# Patient Record
Sex: FEMALE | URBAN - METROPOLITAN AREA
[De-identification: names, ages, dates, MRNs, and addresses within clinical notes are randomized per-mention and may not be internally consistent; named-entity substitution may affect disease eponyms.]

---

## 2022-09-12 ENCOUNTER — APPOINTMENT (OUTPATIENT)
Dept: URBAN - METROPOLITAN AREA CLINIC 253 | Age: 32
Setting detail: DERMATOLOGY
End: 2022-09-14

## 2022-09-12 VITALS — WEIGHT: 160 LBS | HEIGHT: 65 IN

## 2022-09-12 DIAGNOSIS — B07.8 OTHER VIRAL WARTS: ICD-10-CM

## 2022-09-12 PROCEDURE — OTHER BENIGN DESTRUCTION: OTHER

## 2022-09-12 PROCEDURE — 17110 DESTRUCT B9 LESION 1-14: CPT

## 2022-09-12 PROCEDURE — OTHER ADDITIONAL NOTES: OTHER

## 2022-09-12 PROCEDURE — OTHER MIPS QUALITY: OTHER

## 2022-09-12 PROCEDURE — OTHER COUNSELING: OTHER

## 2022-09-12 PROCEDURE — 99202 OFFICE O/P NEW SF 15 MIN: CPT | Mod: 25

## 2022-09-12 ASSESSMENT — LOCATION DETAILED DESCRIPTION DERM: LOCATION DETAILED: RIGHT MEDIAL PLANTAR 1ST TOE

## 2022-09-12 ASSESSMENT — LOCATION ZONE DERM: LOCATION ZONE: TOE

## 2022-09-12 ASSESSMENT — LOCATION SIMPLE DESCRIPTION DERM: LOCATION SIMPLE: PLANTAR SURFACE OF RIGHT 1ST TOE

## 2022-09-12 NOTE — PROCEDURE: ADDITIONAL NOTES
Additional Notes: Recommended using wart stick at home. Recommended 30 min follow up in 2 weeks- may consider bleomycin if not resolved
Detail Level: Detailed
Render Risk Assessment In Note?: no

## 2022-09-12 NOTE — PROCEDURE: BENIGN DESTRUCTION
Treatment Number (Will Not Render If 0): 0
Add 52 Modifier (Optional): no
Anesthesia Type: 2% lidocaine with epinephrine
Anesthesia Volume In Cc: 0.4
Render Post-Care Instructions In Note?: yes
Consent: The patient's consent was obtained including but not limited to risks of crusting, scabbing, blistering, scarring, darker or lighter pigmentary change, recurrence, incomplete removal and infection.
Post-Care Instructions: I reviewed with the patient in detail post-care instructions. Patient is to wear sunprotection, and avoid picking at any of the treated lesions. Pt may apply Vaseline to crusted or scabbing areas.
Medical Necessity Clause: This procedure was medically necessary because the lesions that were treated were:
Medical Necessity Information: It is in your best interest to select a reason for this procedure from the list below. All of these items fulfill various CMS LCD requirements except the new and changing color options.
Detail Level: Detailed

## 2022-09-12 NOTE — HPI: WARTS (VERRUCA)
Is This A New Presentation, Or A Follow-Up?: Warts
Additional History: She has had warts for years. Moved this direction from [place] recently. \\n\\nLast year, she treated at home with OTC kits (liquid nitrogen and wrap kits) \\n\\nShe has tried wart peel (last treatment one week ago).  She has had liquid nitrogen, paring for 5 times.  Previously, she has had laser treatment done in addition to canthacur on other warts years ago.\\n\\nThis is her first visit to [facility] and seeing Emiliana Mancini PA-C

## 2022-10-27 ENCOUNTER — APPOINTMENT (OUTPATIENT)
Dept: URBAN - METROPOLITAN AREA CLINIC 253 | Age: 32
Setting detail: DERMATOLOGY
End: 2022-11-02

## 2022-10-27 VITALS — RESPIRATION RATE: 14 BRPM | WEIGHT: 163 LBS | HEIGHT: 65 IN

## 2022-10-27 DIAGNOSIS — B36.0 PITYRIASIS VERSICOLOR: ICD-10-CM

## 2022-10-27 DIAGNOSIS — B07.8 OTHER VIRAL WARTS: ICD-10-CM

## 2022-10-27 PROCEDURE — OTHER PRESCRIPTION: OTHER

## 2022-10-27 PROCEDURE — OTHER ADDITIONAL NOTES: OTHER

## 2022-10-27 PROCEDURE — OTHER MIPS QUALITY: OTHER

## 2022-10-27 PROCEDURE — 99213 OFFICE O/P EST LOW 20 MIN: CPT | Mod: 25

## 2022-10-27 PROCEDURE — OTHER COUNSELING: OTHER

## 2022-10-27 PROCEDURE — 17110 DESTRUCT B9 LESION 1-14: CPT

## 2022-10-27 PROCEDURE — OTHER BENIGN DESTRUCTION: OTHER

## 2022-10-27 RX ORDER — KETOCONAZOLE 20 MG/G
2% CREAM TOPICAL BID
Qty: 60 | Refills: 2 | Status: ERX | COMMUNITY
Start: 2022-10-27

## 2022-10-27 RX ORDER — KETOCONAZOLE 20 MG/ML
2% SHAMPOO, SUSPENSION TOPICAL QD
Qty: 120 | Refills: 2 | Status: ERX | COMMUNITY
Start: 2022-10-27

## 2022-10-27 ASSESSMENT — LOCATION DETAILED DESCRIPTION DERM
LOCATION DETAILED: LEFT INFERIOR ANTERIOR NECK
LOCATION DETAILED: RIGHT MEDIAL PLANTAR 1ST TOE
LOCATION DETAILED: RIGHT LATERAL PLANTAR 1ST TOE

## 2022-10-27 ASSESSMENT — LOCATION ZONE DERM
LOCATION ZONE: TOE
LOCATION ZONE: NECK

## 2022-10-27 ASSESSMENT — LOCATION SIMPLE DESCRIPTION DERM
LOCATION SIMPLE: PLANTAR SURFACE OF RIGHT 1ST TOE
LOCATION SIMPLE: LEFT ANTERIOR NECK

## 2022-10-27 NOTE — PROCEDURE: BENIGN DESTRUCTION
Add 52 Modifier (Optional): no
Treatment Number (Will Not Render If 0): 0
Render Post-Care Instructions In Note?: yes
Medical Necessity Information: It is in your best interest to select a reason for this procedure from the list below. All of these items fulfill various CMS LCD requirements except the new and changing color options.
Anesthesia Type: 1% lidocaine with epinephrine and a 1:10 solution of 8.4% sodium bicarbonate
Medical Necessity Clause: This procedure was medically necessary because the lesions that were treated were:
Post-Care Instructions: I reviewed with the patient in detail post-care instructions. Patient is to wear sunprotection, and avoid picking at any of the treated lesions. Pt may apply Vaseline to crusted or scabbing areas.
Consent: The patient's consent was obtained including but not limited to risks of crusting, scabbing, blistering, scarring, darker or lighter pigmentary change, recurrence, incomplete removal and infection.
Detail Level: Detailed

## 2022-10-27 NOTE — PROCEDURE: ADDITIONAL NOTES
Additional Notes: We discussed the correlation between Tinea Versicolor and diabetes, the patient has a brother with diabetes. She will consider consulting her PCP for diabetes workup.
Render Risk Assessment In Note?: no
Detail Level: Detailed

## 2022-10-27 NOTE — HPI: RASH
What Type Of Note Output Would You Prefer (Optional)?: Standard Output
Is The Patient Presenting As Previously Scheduled?: Yes
How Severe Is Your Rash?: moderate
Is This A New Presentation, Or A Follow-Up?: Rash
Additional History: The rash started in the beginning of the year.    It gets red and seems to worsen with heat.

## 2023-04-14 ENCOUNTER — LAB REQUISITION (OUTPATIENT)
Dept: LAB | Facility: CLINIC | Age: 33
End: 2023-04-14

## 2023-04-14 DIAGNOSIS — N92.6 IRREGULAR MENSTRUATION, UNSPECIFIED: ICD-10-CM

## 2023-04-14 LAB
MIS SERPL-MCNC: 1.25 NG/ML (ref 0.58–8.1)
PROLACTIN SERPL 3RD IS-MCNC: 20 NG/ML (ref 5–23)

## 2023-04-14 PROCEDURE — 84146 ASSAY OF PROLACTIN: CPT | Performed by: OBSTETRICS & GYNECOLOGY

## 2023-04-14 PROCEDURE — 83520 IMMUNOASSAY QUANT NOS NONAB: CPT | Performed by: OBSTETRICS & GYNECOLOGY

## 2023-04-21 ENCOUNTER — LAB REQUISITION (OUTPATIENT)
Dept: LAB | Facility: CLINIC | Age: 33
End: 2023-04-21

## 2023-04-21 DIAGNOSIS — N92.6 IRREGULAR MENSTRUATION, UNSPECIFIED: ICD-10-CM

## 2023-04-21 LAB
ESTRADIOL SERPL-MCNC: 72 PG/ML
FSH SERPL IRP2-ACNC: 5.7 MIU/ML

## 2023-04-21 PROCEDURE — 83001 ASSAY OF GONADOTROPIN (FSH): CPT | Performed by: OBSTETRICS & GYNECOLOGY

## 2023-04-21 PROCEDURE — 82670 ASSAY OF TOTAL ESTRADIOL: CPT | Performed by: OBSTETRICS & GYNECOLOGY

## 2023-11-09 ENCOUNTER — LAB REQUISITION (OUTPATIENT)
Dept: LAB | Facility: CLINIC | Age: 33
End: 2023-11-09

## 2023-11-09 DIAGNOSIS — N97.9 FEMALE INFERTILITY, UNSPECIFIED: ICD-10-CM

## 2023-11-09 LAB — TSH SERPL DL<=0.005 MIU/L-ACNC: 1.35 UIU/ML (ref 0.3–4.2)

## 2023-11-09 PROCEDURE — 84443 ASSAY THYROID STIM HORMONE: CPT | Performed by: OBSTETRICS & GYNECOLOGY

## 2023-11-09 PROCEDURE — 86762 RUBELLA ANTIBODY: CPT | Performed by: OBSTETRICS & GYNECOLOGY

## 2023-11-10 LAB
RUBV IGG SERPL QL IA: 5.74 INDEX
RUBV IGG SERPL QL IA: POSITIVE

## 2023-11-28 ENCOUNTER — ANCILLARY PROCEDURE (OUTPATIENT)
Dept: GENERAL RADIOLOGY | Facility: CLINIC | Age: 33
End: 2023-11-28
Attending: OBSTETRICS & GYNECOLOGY
Payer: COMMERCIAL

## 2023-11-28 DIAGNOSIS — N97.9 FEMALE INFERTILITY, UNSPECIFIED: ICD-10-CM

## 2023-11-28 PROCEDURE — 74740 X-RAY FEMALE GENITAL TRACT: CPT

## 2023-11-28 PROCEDURE — 58340 CATHETER FOR HYSTEROGRAPHY: CPT

## 2023-11-28 NOTE — PROGRESS NOTES
HSG Procedure    Indications: Assess tubal patency    Pre procedure Diagnosis: Infertility  Post Procedure Diagnosis: Same    UPT: Negative in clinic 11/28/23    Procedure: hysterosalpingogram  Anesthesia: none  EBL: 1 cc  Total Omnipaque: 5 cc    Procedure: With her permission, after explaining the procedure, the patient was placed in the dorsal lithotomy position.  A small Marybeth speculum was inserted.  The cervix was visualized and cleansed with betadine x 3.  A single tooth tenaculum was placed at 12 o' clock. Attempted placement with the catheter. A cervical os finder was needed. The catheter was then inserted to the internal os.      HSG was completed with findings of normal shaped cavity and spill from the right fallopian tube.  Spill not observed from the left side.     See radiology report for complete and final details.    All instruments were removed from the cervix.   Hemostasis attained with pressure.    Aylce Erickson MD  11/28/2023  11:20 AM

## 2024-01-23 ENCOUNTER — HOSPITAL ENCOUNTER (OUTPATIENT)
Facility: CLINIC | Age: 34
Discharge: HOME | End: 2024-01-23
Attending: OBSTETRICS & GYNECOLOGY | Admitting: OBSTETRICS & GYNECOLOGY
Payer: COMMERCIAL

## 2024-01-23 ENCOUNTER — ANESTHESIA (OUTPATIENT)
Dept: SURGERY | Facility: CLINIC | Age: 34
End: 2024-01-23
Payer: COMMERCIAL

## 2024-01-23 ENCOUNTER — ANESTHESIA EVENT (OUTPATIENT)
Dept: SURGERY | Facility: CLINIC | Age: 34
End: 2024-01-23
Payer: COMMERCIAL

## 2024-01-23 VITALS
OXYGEN SATURATION: 98 % | TEMPERATURE: 97.8 F | HEART RATE: 83 BPM | WEIGHT: 169 LBS | HEIGHT: 65 IN | SYSTOLIC BLOOD PRESSURE: 119 MMHG | DIASTOLIC BLOOD PRESSURE: 75 MMHG | RESPIRATION RATE: 11 BRPM | BODY MASS INDEX: 28.16 KG/M2

## 2024-01-23 DIAGNOSIS — N83.202 BILATERAL OVARIAN CYSTS: ICD-10-CM

## 2024-01-23 DIAGNOSIS — N83.201 BILATERAL OVARIAN CYSTS: ICD-10-CM

## 2024-01-23 DIAGNOSIS — Z98.890 S/P LAPAROSCOPY: Primary | ICD-10-CM

## 2024-01-23 DIAGNOSIS — Z31.41 FERTILITY TESTING: ICD-10-CM

## 2024-01-23 LAB — HCG INTACT+B SERPL-ACNC: <1 MIU/ML

## 2024-01-23 PROCEDURE — 84702 CHORIONIC GONADOTROPIN TEST: CPT | Performed by: OBSTETRICS & GYNECOLOGY

## 2024-01-23 PROCEDURE — 88342 IMHCHEM/IMCYTCHM 1ST ANTB: CPT | Mod: 26 | Performed by: PATHOLOGY

## 2024-01-23 PROCEDURE — 250N000025 HC SEVOFLURANE, PER MIN: Performed by: OBSTETRICS & GYNECOLOGY

## 2024-01-23 PROCEDURE — 258N000001 HC RX 258: Performed by: OBSTETRICS & GYNECOLOGY

## 2024-01-23 PROCEDURE — 88302 TISSUE EXAM BY PATHOLOGIST: CPT | Mod: TC | Performed by: OBSTETRICS & GYNECOLOGY

## 2024-01-23 PROCEDURE — 250N000009 HC RX 250: Performed by: NURSE ANESTHETIST, CERTIFIED REGISTERED

## 2024-01-23 PROCEDURE — 258N000003 HC RX IP 258 OP 636: Performed by: NURSE ANESTHETIST, CERTIFIED REGISTERED

## 2024-01-23 PROCEDURE — 250N000011 HC RX IP 250 OP 636: Performed by: NURSE ANESTHETIST, CERTIFIED REGISTERED

## 2024-01-23 PROCEDURE — 88341 IMHCHEM/IMCYTCHM EA ADD ANTB: CPT | Mod: 26 | Performed by: PATHOLOGY

## 2024-01-23 PROCEDURE — 258N000003 HC RX IP 258 OP 636: Performed by: STUDENT IN AN ORGANIZED HEALTH CARE EDUCATION/TRAINING PROGRAM

## 2024-01-23 PROCEDURE — 250N000011 HC RX IP 250 OP 636: Performed by: OBSTETRICS & GYNECOLOGY

## 2024-01-23 PROCEDURE — 36415 COLL VENOUS BLD VENIPUNCTURE: CPT | Performed by: OBSTETRICS & GYNECOLOGY

## 2024-01-23 PROCEDURE — 370N000017 HC ANESTHESIA TECHNICAL FEE, PER MIN: Performed by: OBSTETRICS & GYNECOLOGY

## 2024-01-23 PROCEDURE — 999N000141 HC STATISTIC PRE-PROCEDURE NURSING ASSESSMENT: Performed by: OBSTETRICS & GYNECOLOGY

## 2024-01-23 PROCEDURE — 88305 TISSUE EXAM BY PATHOLOGIST: CPT | Mod: 26 | Performed by: PATHOLOGY

## 2024-01-23 PROCEDURE — 250N000011 HC RX IP 250 OP 636: Performed by: STUDENT IN AN ORGANIZED HEALTH CARE EDUCATION/TRAINING PROGRAM

## 2024-01-23 PROCEDURE — 250N000013 HC RX MED GY IP 250 OP 250 PS 637: Performed by: OBSTETRICS & GYNECOLOGY

## 2024-01-23 PROCEDURE — 258N000003 HC RX IP 258 OP 636: Performed by: OBSTETRICS & GYNECOLOGY

## 2024-01-23 PROCEDURE — 710N000012 HC RECOVERY PHASE 2, PER MINUTE: Performed by: OBSTETRICS & GYNECOLOGY

## 2024-01-23 PROCEDURE — 360N000076 HC SURGERY LEVEL 3, PER MIN: Performed by: OBSTETRICS & GYNECOLOGY

## 2024-01-23 PROCEDURE — 250N000009 HC RX 250: Performed by: OBSTETRICS & GYNECOLOGY

## 2024-01-23 PROCEDURE — 272N000001 HC OR GENERAL SUPPLY STERILE: Performed by: OBSTETRICS & GYNECOLOGY

## 2024-01-23 PROCEDURE — 88302 TISSUE EXAM BY PATHOLOGIST: CPT | Mod: 26 | Performed by: PATHOLOGY

## 2024-01-23 PROCEDURE — 710N000009 HC RECOVERY PHASE 1, LEVEL 1, PER MIN: Performed by: OBSTETRICS & GYNECOLOGY

## 2024-01-23 RX ORDER — FENTANYL CITRATE 50 UG/ML
25 INJECTION, SOLUTION INTRAMUSCULAR; INTRAVENOUS EVERY 5 MIN PRN
Status: DISCONTINUED | OUTPATIENT
Start: 2024-01-23 | End: 2024-01-23 | Stop reason: HOSPADM

## 2024-01-23 RX ORDER — DEXMEDETOMIDINE HYDROCHLORIDE 4 UG/ML
INJECTION, SOLUTION INTRAVENOUS PRN
Status: DISCONTINUED | OUTPATIENT
Start: 2024-01-23 | End: 2024-01-23

## 2024-01-23 RX ORDER — SODIUM CHLORIDE, SODIUM LACTATE, POTASSIUM CHLORIDE, CALCIUM CHLORIDE 600; 310; 30; 20 MG/100ML; MG/100ML; MG/100ML; MG/100ML
INJECTION, SOLUTION INTRAVENOUS CONTINUOUS
Status: DISCONTINUED | OUTPATIENT
Start: 2024-01-23 | End: 2024-01-23 | Stop reason: HOSPADM

## 2024-01-23 RX ORDER — FENTANYL CITRATE 50 UG/ML
50 INJECTION, SOLUTION INTRAMUSCULAR; INTRAVENOUS EVERY 5 MIN PRN
Status: DISCONTINUED | OUTPATIENT
Start: 2024-01-23 | End: 2024-01-23 | Stop reason: HOSPADM

## 2024-01-23 RX ORDER — HYDROMORPHONE HCL IN WATER/PF 6 MG/30 ML
0.4 PATIENT CONTROLLED ANALGESIA SYRINGE INTRAVENOUS EVERY 5 MIN PRN
Status: DISCONTINUED | OUTPATIENT
Start: 2024-01-23 | End: 2024-01-23 | Stop reason: HOSPADM

## 2024-01-23 RX ORDER — OXYCODONE HYDROCHLORIDE 5 MG/1
5 TABLET ORAL
Status: COMPLETED | OUTPATIENT
Start: 2024-01-23 | End: 2024-01-23

## 2024-01-23 RX ORDER — ACETAMINOPHEN 325 MG/1
975 TABLET ORAL ONCE
Status: DISCONTINUED | OUTPATIENT
Start: 2024-01-23 | End: 2024-01-23 | Stop reason: HOSPADM

## 2024-01-23 RX ORDER — OXYCODONE HYDROCHLORIDE 5 MG/1
5-10 TABLET ORAL EVERY 4 HOURS PRN
Qty: 10 TABLET | Refills: 0 | Status: SHIPPED | OUTPATIENT
Start: 2024-01-23

## 2024-01-23 RX ORDER — ONDANSETRON 2 MG/ML
4 INJECTION INTRAMUSCULAR; INTRAVENOUS EVERY 30 MIN PRN
Status: DISCONTINUED | OUTPATIENT
Start: 2024-01-23 | End: 2024-01-23 | Stop reason: HOSPADM

## 2024-01-23 RX ORDER — ACETAMINOPHEN 325 MG/1
975 TABLET ORAL ONCE
Status: COMPLETED | OUTPATIENT
Start: 2024-01-23 | End: 2024-01-23

## 2024-01-23 RX ORDER — ONDANSETRON 4 MG/1
4 TABLET, ORALLY DISINTEGRATING ORAL EVERY 30 MIN PRN
Status: DISCONTINUED | OUTPATIENT
Start: 2024-01-23 | End: 2024-01-23 | Stop reason: HOSPADM

## 2024-01-23 RX ORDER — VASOPRESSIN 20 U/ML
INJECTION PARENTERAL
Status: DISCONTINUED
Start: 2024-01-23 | End: 2024-01-23 | Stop reason: WASHOUT

## 2024-01-23 RX ORDER — DOXYCYCLINE 100 MG/10ML
100 INJECTION, POWDER, LYOPHILIZED, FOR SOLUTION INTRAVENOUS
Status: COMPLETED | OUTPATIENT
Start: 2024-01-23 | End: 2024-01-23

## 2024-01-23 RX ORDER — BUPIVACAINE HYDROCHLORIDE 2.5 MG/ML
INJECTION, SOLUTION INFILTRATION; PERINEURAL PRN
Status: DISCONTINUED | OUTPATIENT
Start: 2024-01-23 | End: 2024-01-23 | Stop reason: HOSPADM

## 2024-01-23 RX ORDER — LIDOCAINE HYDROCHLORIDE 20 MG/ML
INJECTION, SOLUTION INFILTRATION; PERINEURAL PRN
Status: DISCONTINUED | OUTPATIENT
Start: 2024-01-23 | End: 2024-01-23

## 2024-01-23 RX ORDER — FENTANYL CITRATE 50 UG/ML
INJECTION, SOLUTION INTRAMUSCULAR; INTRAVENOUS PRN
Status: DISCONTINUED | OUTPATIENT
Start: 2024-01-23 | End: 2024-01-23

## 2024-01-23 RX ORDER — HYDROMORPHONE HCL IN WATER/PF 6 MG/30 ML
0.2 PATIENT CONTROLLED ANALGESIA SYRINGE INTRAVENOUS EVERY 5 MIN PRN
Status: DISCONTINUED | OUTPATIENT
Start: 2024-01-23 | End: 2024-01-23 | Stop reason: HOSPADM

## 2024-01-23 RX ORDER — DEXAMETHASONE SODIUM PHOSPHATE 4 MG/ML
INJECTION, SOLUTION INTRA-ARTICULAR; INTRALESIONAL; INTRAMUSCULAR; INTRAVENOUS; SOFT TISSUE PRN
Status: DISCONTINUED | OUTPATIENT
Start: 2024-01-23 | End: 2024-01-23

## 2024-01-23 RX ORDER — IBUPROFEN 200 MG
800 TABLET ORAL EVERY 6 HOURS PRN
COMMUNITY
Start: 2024-01-23

## 2024-01-23 RX ORDER — LIDOCAINE 40 MG/G
CREAM TOPICAL
Status: DISCONTINUED | OUTPATIENT
Start: 2024-01-23 | End: 2024-01-23 | Stop reason: HOSPADM

## 2024-01-23 RX ORDER — PROPOFOL 10 MG/ML
INJECTION, EMULSION INTRAVENOUS CONTINUOUS PRN
Status: DISCONTINUED | OUTPATIENT
Start: 2024-01-23 | End: 2024-01-23

## 2024-01-23 RX ORDER — PROPOFOL 10 MG/ML
INJECTION, EMULSION INTRAVENOUS PRN
Status: DISCONTINUED | OUTPATIENT
Start: 2024-01-23 | End: 2024-01-23

## 2024-01-23 RX ORDER — IBUPROFEN 400 MG/1
800 TABLET, FILM COATED ORAL ONCE
Status: DISCONTINUED | OUTPATIENT
Start: 2024-01-23 | End: 2024-01-23 | Stop reason: HOSPADM

## 2024-01-23 RX ORDER — DOCUSATE SODIUM 100 MG/1
100 CAPSULE, LIQUID FILLED ORAL 2 TIMES DAILY PRN
COMMUNITY
Start: 2024-01-23

## 2024-01-23 RX ORDER — ONDANSETRON 2 MG/ML
INJECTION INTRAMUSCULAR; INTRAVENOUS PRN
Status: DISCONTINUED | OUTPATIENT
Start: 2024-01-23 | End: 2024-01-23

## 2024-01-23 RX ORDER — KETOROLAC TROMETHAMINE 30 MG/ML
30 INJECTION, SOLUTION INTRAMUSCULAR; INTRAVENOUS ONCE
Status: COMPLETED | OUTPATIENT
Start: 2024-01-23 | End: 2024-01-23

## 2024-01-23 RX ORDER — BUPIVACAINE HYDROCHLORIDE 2.5 MG/ML
INJECTION, SOLUTION EPIDURAL; INFILTRATION; INTRACAUDAL
Status: DISCONTINUED
Start: 2024-01-23 | End: 2024-01-23 | Stop reason: HOSPADM

## 2024-01-23 RX ORDER — ACETAMINOPHEN 325 MG/1
650 TABLET ORAL EVERY 6 HOURS PRN
COMMUNITY
Start: 2024-01-23

## 2024-01-23 RX ORDER — MAGNESIUM HYDROXIDE 1200 MG/15ML
LIQUID ORAL PRN
Status: DISCONTINUED | OUTPATIENT
Start: 2024-01-23 | End: 2024-01-23 | Stop reason: HOSPADM

## 2024-01-23 RX ADMIN — KETOROLAC TROMETHAMINE 30 MG: 30 INJECTION, SOLUTION INTRAMUSCULAR; INTRAVENOUS at 14:30

## 2024-01-23 RX ADMIN — LIDOCAINE HYDROCHLORIDE 100 MG: 20 INJECTION, SOLUTION INFILTRATION; PERINEURAL at 12:32

## 2024-01-23 RX ADMIN — SUGAMMADEX 200 MG: 100 INJECTION, SOLUTION INTRAVENOUS at 14:03

## 2024-01-23 RX ADMIN — FENTANYL CITRATE 25 MCG: 50 INJECTION, SOLUTION INTRAMUSCULAR; INTRAVENOUS at 14:33

## 2024-01-23 RX ADMIN — PHENYLEPHRINE HYDROCHLORIDE 0.25 MCG/KG/MIN: 10 INJECTION INTRAVENOUS at 13:14

## 2024-01-23 RX ADMIN — PROPOFOL 200 MG: 10 INJECTION, EMULSION INTRAVENOUS at 12:32

## 2024-01-23 RX ADMIN — PROPOFOL 20 MCG/KG/MIN: 10 INJECTION, EMULSION INTRAVENOUS at 12:45

## 2024-01-23 RX ADMIN — HYDROMORPHONE HYDROCHLORIDE 0.2 MG: 0.2 INJECTION, SOLUTION INTRAMUSCULAR; INTRAVENOUS; SUBCUTANEOUS at 14:39

## 2024-01-23 RX ADMIN — FENTANYL CITRATE 25 MCG: 50 INJECTION, SOLUTION INTRAMUSCULAR; INTRAVENOUS at 14:18

## 2024-01-23 RX ADMIN — DEXAMETHASONE SODIUM PHOSPHATE 4 MG: 4 INJECTION, SOLUTION INTRA-ARTICULAR; INTRALESIONAL; INTRAMUSCULAR; INTRAVENOUS; SOFT TISSUE at 12:45

## 2024-01-23 RX ADMIN — HYDROMORPHONE HYDROCHLORIDE 0.5 MG: 1 INJECTION, SOLUTION INTRAMUSCULAR; INTRAVENOUS; SUBCUTANEOUS at 12:58

## 2024-01-23 RX ADMIN — DOXYCYCLINE 100 MG: 100 INJECTION, POWDER, LYOPHILIZED, FOR SOLUTION INTRAVENOUS at 10:37

## 2024-01-23 RX ADMIN — HYDROMORPHONE HYDROCHLORIDE 0.2 MG: 0.2 INJECTION, SOLUTION INTRAMUSCULAR; INTRAVENOUS; SUBCUTANEOUS at 14:47

## 2024-01-23 RX ADMIN — SODIUM CHLORIDE, POTASSIUM CHLORIDE, SODIUM LACTATE AND CALCIUM CHLORIDE: 600; 310; 30; 20 INJECTION, SOLUTION INTRAVENOUS at 10:37

## 2024-01-23 RX ADMIN — DEXMEDETOMIDINE HYDROCHLORIDE 8 MCG: 200 INJECTION INTRAVENOUS at 12:52

## 2024-01-23 RX ADMIN — MIDAZOLAM 2 MG: 1 INJECTION INTRAMUSCULAR; INTRAVENOUS at 12:30

## 2024-01-23 RX ADMIN — DEXMEDETOMIDINE HYDROCHLORIDE 12 MCG: 200 INJECTION INTRAVENOUS at 12:45

## 2024-01-23 RX ADMIN — ROCURONIUM BROMIDE 50 MG: 50 INJECTION, SOLUTION INTRAVENOUS at 12:32

## 2024-01-23 RX ADMIN — ONDANSETRON 4 MG: 2 INJECTION INTRAMUSCULAR; INTRAVENOUS at 13:50

## 2024-01-23 RX ADMIN — ACETAMINOPHEN 975 MG: 325 TABLET, FILM COATED ORAL at 10:42

## 2024-01-23 RX ADMIN — FENTANYL CITRATE 100 MCG: 50 INJECTION INTRAMUSCULAR; INTRAVENOUS at 12:32

## 2024-01-23 RX ADMIN — SODIUM CHLORIDE, POTASSIUM CHLORIDE, SODIUM LACTATE AND CALCIUM CHLORIDE: 600; 310; 30; 20 INJECTION, SOLUTION INTRAVENOUS at 14:49

## 2024-01-23 RX ADMIN — OXYCODONE HYDROCHLORIDE 5 MG: 5 TABLET ORAL at 14:58

## 2024-01-23 ASSESSMENT — ACTIVITIES OF DAILY LIVING (ADL)
ADLS_ACUITY_SCORE: 35

## 2024-01-23 NOTE — PROCEDURES
[facility]  Gynecology Brief Operative Note    Pre-operative diagnosis: Bilateral ovarian cysts [N83.201, N83.202]  Fertility testing [Z31.41]   Post-operative diagnosis * No post-op diagnosis entered *   Procedure: Procedure(s):  laparoscopic bilateral ovarian cystectomy and bilateral tubal dye, LEFT SALPINGECTOMY, CAUTERIZATION OF ENDOMETRIOSIS  and bilateral tubal dye study   Surgeon: Jennifer L. Schwab, MD   Assistants(s): Sherwin Shah MD   Anesthesia: General    Estimated blood loss: Less than 10 ml    Total IV fluids: (See anesthesia record)   Blood transfusion: No transfusion was given during surgery   Total urine output: (See anesthesia record)   Drains: None   Specimens: Left and right ovarian cyst walls, left and right ovarian masses, left fallopian tube   Implants: None   Findings: Clubbed left fallopian tube with obstruction confirmed on tubal dye study. Sluggish spill from right fallopian tube. 3 cm and 1.5 cm left ovarian endometriomas, 2 cm left ovarian mass (favor fibroma). 5 cm right ovarian simple cyst. 2 small right ovarian endometriomas (<1 cm each), 0.5 cm right ovarian mass (consistent with fibroma). Endometriosis in left posterior cul de sac, otherwise no other lesions of endometriosis seen.   Complications: None   Condition: Stable   Comments: See dictated operative report for full details

## 2024-01-23 NOTE — PROCEDURES
[facility]  Gynecology Operative Note    Pre-operative diagnosis: Bilateral ovarian cysts [N83.201, N83.202]  Fertility testing [Z31.41]   Post-operative diagnosis * No post-op diagnosis entered *   Procedure: Procedure(s):  laparoscopic bilateral ovarian cystectomy and bilateral tubal dye, LEFT SALPINGECTOMY, CAUTERIZATION OF ENDOMETRIOSIS  and bilateral tubal dye study   Surgeon: Jennifer L. Schwab, MD   Assistants(s): Sherwin Shah MD   Anesthesia: General    Estimated blood loss: Less than 10 ml    Total IV fluids: (See anesthesia record)   Blood transfusion: No transfusion was given during surgery   Total urine output: (See anesthesia record)   Drains: None   Specimens:   Left and right ovarian cyst walls, left and right ovarian masses, left fallopian tube          Findings:   Clubbed left fallopian tube with obstruction confirmed on tubal dye study. Sluggish spill from right fallopian tube, also with slightly clubbed appearance. 3 cm and 1.5 cm left ovarian endometriomas, 2 cm left ovarian mass (favor fibroma). 5 cm right ovarian simple cyst. 2 small right ovarian endometriomas (<1 cm each), 0.5 cm right ovarian mass (consistent with fibroma). Endometriosis in left posterior cul de sac, otherwise no other lesions of endometriosis seen.      Procedure:     The patient was counseled and consented and taken to the operating room where general anesthesia was administered. She was placed in the dorsal lithotomy position using yellow fin stirrups and prepped and draped in normal sterile fashion. After a time out was performed, arroyo catheter placed. [place] speculum placed in speculum and acorn uterine manipulator placed. Speculum removed. Attention was turned to the abdomen. An incision was made in the umbilicus and veress needle inserted. Proper placement confirmed with water drop test and low opening pressure. Pneumoperitoneum obtained. 5 mm trocars placed in umbilicus, followed by  right and left lower quadrants under direct visualization. Survey of the pelvic revealed the aforementioned findings. Tubal dye study perfomed with 60 mL of methylene blue dye. There was a small, delayed amount of spill on the right. No spill seen on the left, consistent with recent HSG. The patient was counseled extensively prior to the surgery and left salpingectomy was performed, with retention of right fallopian tube. Left fallopian tube was excised with the ligasure maryland and removed through the right lower quadrant trocar. A pale pink, verrucous, firm, pedunculated 2 cm left ovarian mass was excised with the ligasure and placed in posterior cul de sac. Left ovarian cystectomy of 3 and 1.5 cm endometriomas performed next, with spill of brown fluid. Excess tissue excised and removed through right trocar and sent for pathology. Attention was then turned to the right adnexa. The 5 cm ovarian cyst was excised with the ligasure and spilled clear fluid. Two small superficial endometriomas were incised and drained with monopolar hook. The right lower quadrant trocar was removed and extended to insert an 11 mm trocar. Endocatch bag used to removed left ovarian mass. A 0.5 firm, white pedunculated right ovarian mass was then excised with the ligasure and removed through the 11 mm trocar. A small brown and adjacent webbed lesion of endometriosis was cauterized in left posterior cul de sac with the monopolar hook.. There were no adhesions or other lesions of endometriosis seen. Hemostasis observed. Robby Thompsen device used to close fascia of right lower quadrant incision with 0 vicryl. Pneumoperitoneum released and remaining trocars removed. Skin was closed with 4-0 vicryl in a subcuticular fashion. Incisions covered with steri strips and band aids. Rutledge catheter and uterine manipulator removed and hemostasis observed. All counts were correct. The patient was extubated and transferred to recovery in stable  condition.          Jennifer L. Schwab, MD

## 2024-01-23 NOTE — ANESTHESIA PROCEDURE NOTES
Airway       Patient location during procedure: OR       Procedure Start/Stop Times: 1/23/2024 12:34 PM  Staff -        Anesthesiologist:  Margarito Malloy MD       CRNA: Erika Romero APRN CRNA       Performed By: CRNA  Consent for Airway        Urgency: elective  Indications and Patient Condition       Indications for airway management: mireille-procedural       Induction type:intravenous       Mask difficulty assessment: 1 - vent by mask    Final Airway Details       Final airway type: endotracheal airway       Successful airway: ETT - single  Endotracheal Airway Details        ETT size (mm): 7.0       Cuffed: yes       Successful intubation technique: direct laryngoscopy       DL Blade Type: MAC 3       Grade View of Cords: 3       Adjucts: stylet       Position: Right       Measured from: gums/teeth       Secured at (cm): 21       Bite block used: None    Post intubation assessment        Placement verified by: capnometry, equal breath sounds and chest rise        Number of attempts at approach: 1       Secured with: tape       Ease of procedure: easy (Anterior view with MAC 3. Suggest glidescope.)       Dentition: Intact and Unchanged    Medication(s) Administered   Medication Administration Time: 1/23/2024 12:34 PM

## 2024-01-23 NOTE — ANESTHESIA PREPROCEDURE EVALUATION
"Anesthesia Pre-Procedure Evaluation    Patient: Beverly Marvin   MRN: [id number] : 1990        Procedure : Procedure(s):  laparoscopic bilateral ovarian cystectomy and bilateral tubal dye  and bilateral tubal dye study          Past Medical History:   Diagnosis Date    Fibroid uterus     Gastroesophageal reflux disease     Motion sickness     Ovarian cyst     PONV (postoperative nausea and vomiting)     Uncomplicated asthma       Past Surgical History:   Procedure Laterality Date    ORTHOPEDIC SURGERY      broken arm    wisdom teeth extraction        No Known Allergies   Social History     Tobacco Use    Smoking status: Never    Smokeless tobacco: Never   Substance Use Topics    Alcohol use: Yes     Comment: rarely      Wt Readings from Last 1 Encounters:   24 76.7 kg (169 lb)        Anesthesia Evaluation   Pt has had prior anesthetic.     History of anesthetic complications  - PONV.      ROS/MED HX  ENT/Pulmonary:     (+)                     Intermittent, asthma                  Neurologic:    (-) migraines   Cardiovascular:    (-) pacemaker, stent, pacemaker and ICD   METS/Exercise Tolerance: >4 METS    Hematologic:  - neg hematologic  ROS     Musculoskeletal:  - neg musculoskeletal ROS     GI/Hepatic:     (+) GERD, Asymptomatic on medication,                  Renal/Genitourinary:    (-) renal disease   Endo:    (-) Type II DM and obesity   Psychiatric/Substance Use:    (-) psychiatric history   Infectious Disease:  - neg infectious disease ROS     Malignancy:  - neg malignancy ROS     Other:     (-) Any chance pregnant       Physical Exam    Airway        Mallampati: I   TM distance: > 3 FB   Neck ROM: full   Mouth opening: > 3 cm    Respiratory Devices and Support         Dental       (+) Minor Abnormalities - some fillings, tiny chips      Cardiovascular   cardiovascular exam normal          Pulmonary   pulmonary exam normal                OUTSIDE LABS:  CBC: No results found for: \"WBC\", \"HGB\", " "\"HCT\", \"PLT\"  BMP: No results found for: \"NA\", \"POTASSIUM\", \"CHLORIDE\", \"CO2\", \"BUN\", \"CR\", \"GLC\"  COAGS: No results found for: \"PTT\", \"INR\", \"FIBR\"  POC: No results found for: \"BGM\", \"HCG\", \"HCGS\"  HEPATIC: No results found for: \"ALBUMIN\", \"PROTTOTAL\", \"ALT\", \"AST\", \"GGT\", \"ALKPHOS\", \"BILITOTAL\", \"BILIDIRECT\", \"CHADWICK\"  OTHER:   Lab Results   Component Value Date    TSH 1.35 11/09/2023       Anesthesia Plan    ASA Status:  2    NPO Status:  NPO Appropriate    Anesthesia Type: General.     - Airway: ETT   Induction: Propofol.   Maintenance: Balanced.        Consents    Anesthesia Plan(s) and associated risks, benefits, and realistic alternatives discussed. Questions answered and patient/representative(s) expressed understanding.     - Discussed:     - Discussed with:  Patient            Postoperative Care    Pain management: IV analgesics.   PONV prophylaxis: Ondansetron (or other 5HT-3), Dexamethasone or Solumedrol, Background Propofol Infusion     Comments:               Margarito Malloy MD    I have reviewed the pertinent notes and labs in the chart from the past 30 days and (re)examined the patient.  Any updates or changes from those notes are reflected in this note.              # Overweight: Estimated body mass index is 28.12 kg/m  as calculated from the following:    Height as of this encounter: 1.651 m (5' 5\").    Weight as of this encounter: 76.7 kg (169 lb).      "

## 2024-01-23 NOTE — ANESTHESIA POSTPROCEDURE EVALUATION
Patient: Beverly Marvin    Procedure: Procedure(s):  laparoscopic bilateral ovarian cystectomy and bilateral tubal dye, LEFT SALPINGECTOMY, CAUTERIZATION OF ENDOMETRIOSIS  and bilateral tubal dye study       Anesthesia Type:  General    Note:  Disposition: Outpatient   Postop Pain Control: Uneventful            Sign Out: Well controlled pain   PONV: No   Neuro/Psych: Uneventful            Sign Out: Acceptable/Baseline neuro status   Airway/Respiratory: Uneventful            Sign Out: Acceptable/Baseline resp. status   CV/Hemodynamics: Uneventful            Sign Out: Acceptable CV status   Other NRE: NONE   DID A NON-ROUTINE EVENT OCCUR?            Last vitals:  Vitals Value Taken Time   /82 01/23/24 1447   Temp     Pulse 86 01/23/24 1458   Resp 11 01/23/24 1458   SpO2 84 % 01/23/24 1458   Vitals shown include unfiled device data.    Electronically Signed By: Margarito Malloy MD  January 23, 2024  3:00 PM

## 2024-01-23 NOTE — DISCHARGE INSTRUCTIONS
Same Day Surgery Discharge Instructions for  Sedation and General Anesthesia     It's not unusual to feel dizzy, light-headed or faint for up to 24 hours after surgery or while taking pain medication.  If you have these symptoms: sit for a few minutes before standing and have someone assist you when you get up to walk or use the bathroom.    You should rest and relax for the next 24 hours. We recommend you make arrangements to have an adult stay with you for at least 24 hours after your discharge.  Avoid hazardous and strenuous activity.    DO NOT DRIVE any vehicle or operate mechanical equipment for 24 hours following the end of your surgery.  Even though you may feel normal, your reactions may be affected by the medication you have received.    Do not drink alcoholic beverages for 24 hours following surgery.     Slowly progress to your regular diet as you feel able. It's not unusual to feel nauseated and/or vomit after receiving anesthesia.  If you develop these symptoms, drink clear liquids (apple juice, ginger ale, broth, 7-up, etc. ) until you feel better.  If your nausea and vomiting persists for 24 hours, please notify your surgeon.      All narcotic pain medications, along with inactivity and anesthesia, can cause constipation. Drinking plenty of liquids and increasing fiber intake will help.    For any questions of a medical nature, call your surgeon.    Do not make important decisions for 24 hours.    If you had general anesthesia, you may have a sore throat for a couple of days related to the breathing tube used during surgery.  You may use Cepacol lozenges to help with this discomfort.  If it worsens or if you develop a fever, contact your surgeon.     If you feel your pain is not well managed with the pain medications prescribed by your surgeon, please contact your surgeon's office to let them know so they can address your concerns.        Today you were given 975 mg of Tylenol at 10:40 am. The  recommended daily maximum dose is 4000 mg.     Today you received Toradol, an antiinflammatory medication similar to Ibuprofen.  You should not take other antiinflammatory medication, such as Ibuprofen, Motrin, Advil, Aleve, Naprosyn, etc until 8:30 PM.     **If you have questions or concerns about your procedure,   call Dr. Schwab  **

## 2024-01-25 LAB
PATH REPORT.COMMENTS IMP SPEC: NORMAL
PATH REPORT.COMMENTS IMP SPEC: NORMAL
PATH REPORT.FINAL DX SPEC: NORMAL
PATH REPORT.GROSS SPEC: NORMAL
PATH REPORT.MICROSCOPIC SPEC OTHER STN: NORMAL
PATH REPORT.RELEVANT HX SPEC: NORMAL
PHOTO IMAGE: NORMAL

## 2024-04-11 ENCOUNTER — APPOINTMENT (OUTPATIENT)
Dept: URBAN - METROPOLITAN AREA CLINIC 253 | Age: 34
Setting detail: DERMATOLOGY
End: 2024-04-11

## 2024-04-11 VITALS — HEIGHT: 65 IN | WEIGHT: 170 LBS | RESPIRATION RATE: 14 BRPM

## 2024-04-11 DIAGNOSIS — B07.8 OTHER VIRAL WARTS: ICD-10-CM

## 2024-04-11 PROCEDURE — OTHER COUNSELING: OTHER

## 2024-04-11 PROCEDURE — OTHER MIPS QUALITY: OTHER

## 2024-04-11 PROCEDURE — OTHER BENIGN DESTRUCTION: OTHER

## 2024-04-11 PROCEDURE — 17110 DESTRUCT B9 LESION 1-14: CPT

## 2024-04-11 ASSESSMENT — LOCATION SIMPLE DESCRIPTION DERM: LOCATION SIMPLE: PLANTAR SURFACE OF RIGHT 1ST TOE

## 2024-04-11 ASSESSMENT — LOCATION DETAILED DESCRIPTION DERM
LOCATION DETAILED: RIGHT LATERAL PLANTAR 1ST TOE
LOCATION DETAILED: RIGHT MEDIAL PLANTAR 1ST TOE

## 2024-04-11 ASSESSMENT — LOCATION ZONE DERM: LOCATION ZONE: TOE

## 2024-04-11 NOTE — PROCEDURE: BENIGN DESTRUCTION
7 days of URI with facial pain.  Treating with mucinex.  
Add 52 Modifier (Optional): no
Treatment Number (Will Not Render If 0): 0
Render Post-Care Instructions In Note?: yes
Medical Necessity Information: It is in your best interest to select a reason for this procedure from the list below. All of these items fulfill various CMS LCD requirements except the new and changing color options.
Medical Necessity Clause: This procedure was medically necessary because the lesions that were treated were:
Post-Care Instructions: I reviewed with the patient in detail post-care instructions. Patient is to wear sunprotection, and avoid picking at any of the treated lesions. Pt may apply Vaseline to crusted or scabbing areas.
Consent: The patient's consent was obtained including but not limited to risks of crusting, scabbing, blistering, scarring, darker or lighter pigmentary change, recurrence, incomplete removal and infection.
Detail Level: Detailed

## 2024-09-05 ENCOUNTER — APPOINTMENT (OUTPATIENT)
Dept: URBAN - METROPOLITAN AREA CLINIC 253 | Age: 34
Setting detail: DERMATOLOGY
End: 2024-09-05

## 2024-09-05 VITALS — WEIGHT: 170 LBS | RESPIRATION RATE: 14 BRPM | HEIGHT: 65 IN

## 2024-09-05 DIAGNOSIS — B07.8 OTHER VIRAL WARTS: ICD-10-CM

## 2024-09-05 PROCEDURE — OTHER MIPS QUALITY: OTHER

## 2024-09-05 PROCEDURE — 17110 DESTRUCT B9 LESION 1-14: CPT

## 2024-09-05 PROCEDURE — OTHER BENIGN DESTRUCTION: OTHER

## 2024-09-05 PROCEDURE — OTHER COUNSELING: OTHER

## 2024-09-05 ASSESSMENT — LOCATION DETAILED DESCRIPTION DERM: LOCATION DETAILED: RIGHT MEDIAL PLANTAR 1ST TOE

## 2024-09-05 ASSESSMENT — LOCATION ZONE DERM: LOCATION ZONE: TOE

## 2024-09-05 ASSESSMENT — LOCATION SIMPLE DESCRIPTION DERM: LOCATION SIMPLE: PLANTAR SURFACE OF RIGHT 1ST TOE

## 2024-09-05 NOTE — PROCEDURE: BENIGN DESTRUCTION
Consent: The patient's consent was obtained including but not limited to risks of crusting, scabbing, blistering, scarring, darker or lighter pigmentary change, recurrence, incomplete removal and infection.
Medical Necessity Clause: This procedure was medically necessary because the lesions that were treated were:
Add 52 Modifier (Optional): no
Medical Necessity Information: It is in your best interest to select a reason for this procedure from the list below. All of these items fulfill various CMS LCD requirements except the new and changing color options.
Anesthesia Type: 2% lidocaine with epinephrine and a 1:10 solution of 8.4% sodium bicarbonate
Anesthesia Volume In Cc: 0.3
Render Post-Care Instructions In Note?: yes
Post-Care Instructions: I reviewed with the patient in detail post-care instructions. Patient is to wear sunprotection, and avoid picking at any of the treated lesions. Pt may apply Vaseline to crusted or scabbing areas.
Detail Level: Zone
Treatment Number (Will Not Render If 0): 0

## 2024-10-03 ENCOUNTER — APPOINTMENT (OUTPATIENT)
Dept: URBAN - METROPOLITAN AREA CLINIC 253 | Age: 34
Setting detail: DERMATOLOGY
End: 2024-10-03

## 2024-10-03 VITALS — RESPIRATION RATE: 14 BRPM | HEIGHT: 65 IN | WEIGHT: 170 LBS

## 2024-10-03 DIAGNOSIS — B07.8 OTHER VIRAL WARTS: ICD-10-CM

## 2024-10-03 PROCEDURE — OTHER MIPS QUALITY: OTHER

## 2024-10-03 PROCEDURE — OTHER BENIGN DESTRUCTION: OTHER

## 2024-10-03 PROCEDURE — OTHER COUNSELING: OTHER

## 2024-10-03 PROCEDURE — 17110 DESTRUCT B9 LESION 1-14: CPT

## 2024-10-03 ASSESSMENT — LOCATION ZONE DERM: LOCATION ZONE: TOE

## 2024-10-03 ASSESSMENT — LOCATION SIMPLE DESCRIPTION DERM: LOCATION SIMPLE: PLANTAR SURFACE OF RIGHT 1ST TOE

## 2024-10-03 ASSESSMENT — LOCATION DETAILED DESCRIPTION DERM: LOCATION DETAILED: RIGHT MEDIAL PLANTAR 1ST TOE

## 2024-10-30 ENCOUNTER — APPOINTMENT (OUTPATIENT)
Dept: URBAN - METROPOLITAN AREA CLINIC 253 | Age: 34
Setting detail: DERMATOLOGY
End: 2024-10-30

## 2024-10-30 VITALS — RESPIRATION RATE: 14 BRPM | HEIGHT: 65 IN | WEIGHT: 170 LBS

## 2024-10-30 DIAGNOSIS — B07.8 OTHER VIRAL WARTS: ICD-10-CM

## 2024-10-30 PROCEDURE — OTHER MIPS QUALITY: OTHER

## 2024-10-30 PROCEDURE — 99213 OFFICE O/P EST LOW 20 MIN: CPT

## 2024-10-30 PROCEDURE — OTHER ADDITIONAL NOTES: OTHER

## 2024-10-30 PROCEDURE — OTHER COUNSELING: OTHER

## 2024-10-30 NOTE — PROCEDURE: ADDITIONAL NOTES
Render Risk Assessment In Note?: no
Detail Level: Zone
Additional Notes: Pared with 15 blade today. Normal skin lines visualized, wart has resolved. No further treatment needed.

## 2024-12-15 ENCOUNTER — HEALTH MAINTENANCE LETTER (OUTPATIENT)
Age: 34
End: 2024-12-15

## 2025-02-10 PROCEDURE — 88305 TISSUE EXAM BY PATHOLOGIST: CPT | Mod: TC,ORL | Performed by: OBSTETRICS & GYNECOLOGY

## 2025-02-10 PROCEDURE — 88305 TISSUE EXAM BY PATHOLOGIST: CPT | Mod: 26 | Performed by: PATHOLOGY

## 2025-02-10 PROCEDURE — 88307 TISSUE EXAM BY PATHOLOGIST: CPT | Mod: 26 | Performed by: PATHOLOGY

## 2025-02-11 ENCOUNTER — LAB REQUISITION (OUTPATIENT)
Dept: LAB | Facility: CLINIC | Age: 35
End: 2025-02-11
Payer: COMMERCIAL

## 2025-02-11 DIAGNOSIS — N80.9 ENDOMETRIOSIS, UNSPECIFIED: ICD-10-CM

## 2025-02-13 LAB
PATH REPORT.COMMENTS IMP SPEC: ABNORMAL
PATH REPORT.COMMENTS IMP SPEC: YES
PATH REPORT.COMMENTS IMP SPEC: YES
PATH REPORT.FINAL DX SPEC: ABNORMAL
PATH REPORT.FINAL DX SPEC: ABNORMAL
PATH REPORT.GROSS SPEC: ABNORMAL
PATH REPORT.GROSS SPEC: ABNORMAL
PATH REPORT.MICROSCOPIC SPEC OTHER STN: ABNORMAL
PATH REPORT.MICROSCOPIC SPEC OTHER STN: ABNORMAL
PATH REPORT.RELEVANT HX SPEC: ABNORMAL
PATH REPORT.RELEVANT HX SPEC: ABNORMAL
PHOTO IMAGE: ABNORMAL
PHOTO IMAGE: ABNORMAL

## 2025-02-27 ENCOUNTER — TRANSFERRED RECORDS (OUTPATIENT)
Dept: HEALTH INFORMATION MANAGEMENT | Facility: CLINIC | Age: 35
End: 2025-02-27
Payer: COMMERCIAL

## 2025-03-06 NOTE — PROGRESS NOTES
Gynecologic Oncology Progress Note            RE: Joan Morales  : 1990  JOMAR: Mar 11, 2025      I had the pleasure of seeing your patient Joan Morales here at the Gynecologic Cancer Clinic at the [facility] on 3/11/2025.  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  Given these findings she was subsequently sent to the Gynecologic Cancer Clinic for new patient consultation.  She is accompanied today by her .    She has had a long course of infertility and endometriosis.  She has undergone several laparoscopies for fulguration of endometriosis, previously all pathology has been benign.  Most recently, however she underwent bilateral cystectomies which revealed borderline tumors of the bilateral ovaries with surfaces involvement on the right.  No pelvic washings were obtained.  She has 5 frozen day 3 embryo's with [facility] and is getting her infertility care in [place].  She has met with them and the infertility specialist is ok to proceed with embryo transfer if deemed appropriate.      24:  Laparoscopic bilateral ovarian cystectomy and bilateral tubal dye, left salpingectomy, cauterization of endometriosis with Dr Jennifer Schwab   Pathology:  Benign endometriotic cysts bilaterally    2/10/25:  Laparoscopic bilateral ovarian cystectomy with Dr Chapincito Riggs   Pathology:  Bilateral ovaries with serous borderline tumor, on the right there is focal surface involvement      Obstetrics and Gynecology History:  G0  She has decided that she is not interested in further oocyte retrieval but would like to attempt to use the embryo's she currently has frozen      Past Medical History:  Past Medical History:   Diagnosis Date    Gastroesophageal reflux disease     Ovarian tumor of borderline malignancy, unspecified laterality     Bilateral    Uncomplicated asthma        Past Surgical History:  Past Surgical  "History:   Procedure Laterality Date    LAPAROSCOPIC ABLATION ENDOMETRIOSIS  01/23/2024    Procedure: Laparoscopic ablation endometriosis;  Surgeon: Schwab, Jennifer Lani, MD;  Location: [facility]    LAPAROSCOPIC CYSTECTOMY OVARIAN (BENIGN) Bilateral 01/23/2024    Procedure: laparoscopic bilateral ovarian cystectomy and bilateral tubal dye, LEFT SALPINGECTOMY, CAUTERIZATION OF ENDOMETRIOSIS;  Surgeon: Schwab, Jennifer Lani, MD;  Location: [facility]    LAPAROSCOPIC SALPINGECTOMY Left 01/23/2024    Procedure: Laparoscopic salpingectomy;  Surgeon: Schwab, Jennifer Lani, MD;  Location: [facility]    LAPAROSCOPIC TUBAL DYE STUDY Bilateral 01/23/2024    Procedure: and bilateral tubal dye study;  Surgeon: Schwab, Jennifer Lani, MD;  Location:  OR    ORTHOPEDIC SURGERY      broken arm    wisdom teeth extraction         Health Maintenance:  Last Pap Smear: 11/9/23              Result: Negative, HPV negative  She has not had a history of abnormal Pap smears.    Last Mammogram: N/A    Last Colonoscopy: N/A       Social History:  Lives with her spouse, feels safe at home.  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  [place].  Does not have an advanced directive on file and would like her , Bakari to be her POA.  Full code.    Family History:   The patient's family history is significant for.  Family History   Problem Relation Age of Onset    No Known Problems Mother     No Known Problems Father     Hearing Loss Sister     Diabetes Brother     Lymphoma Maternal Grandmother          Physical Exam:   /87   Pulse 101   Temp 97.6  F (36.4  C) (Tympanic)   Resp 16   Ht 1.668 m (5' 5.65\")   Wt 74.4 kg (164 lb)   SpO2 99%   BMI 26.75 kg/m    GENERAL: alert and no distress  EYES: Eyes grossly normal to inspection.  No discharge or erythema, or obvious scleral/conjunctival abnormalities.  RESP: No audible wheeze, cough, or visible cyanosis.    SKIN: Visible " skin clear. No significant rash, abnormal pigmentation or lesions.  NEURO: Cranial nerves grossly intact.  Mentation and speech appropriate for age.  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     A total of 60 minutes was spent on patient care today.       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  We discussed the nature of borderline tumors and the possible clinical courses they can take.  We discussed that they are a surgical problem and we do not typically give chemotherapy for borderline tumors.  We discussed the standard of care would be for a hysterectomy with removal of both ovaries and fallopian tubes with staging procedure (excluding lymph node evaluation), however given her age and desire for fertility we did also discuss other potential options.  We discussed possibility of a diagnostic laparoscopy to assess for evidence of metastatic disease and for a better visual assessment of the ovaries following her most recent procedure. We could then discuss my findings and she could make a more informed decision about how to ultimately proceed.  We also discussed proceeding with bilateral oophorectomy while retaining her uterus so that she would be eligible for embryo transfer.  We discussed that there are two separate issues at play in terms of oophorectomy, fertility as well as hormonal.  We discussed the risks of premature menopause in terms of cardiovascular disease and osteoporosis as well as a myriad of other effects of premature menopause.  We discussed the scant data on  safety of ERT in the setting of a borderline tumor and potential for increased recurrence rates.  We also discussed that there is little data on pregnancy outcomes or recurrence outcomes of borderline tumors during pregnancy.  There are many reports of successful pregnancy after fertility sparing approaches to borderline tumors. She and her  asked many thoughtful and informed questions.  They would like to consider these options further and were provided with the contact information for our clinic so they can reach out with questions at any time.     2.) Genetic risk factors were assessed and the patient already has undergone genetic testing and results are pending at this time.  She will provide those results when they become available    3.) Labs and/or tests ordered include:  none.     4.) Health maintenance issues addressed today include pt is up to date.          Thank you for allowing us to participate in the care of your patient.         Sincerely,    Asha Stover MD  Gynecologic Oncology  [facility]       CC  SELF, REFERRED

## 2025-03-11 ENCOUNTER — ONCOLOGY VISIT (OUTPATIENT)
Dept: ONCOLOGY | Facility: CLINIC | Age: 35
End: 2025-03-11
Attending: OBSTETRICS & GYNECOLOGY
Payer: COMMERCIAL

## 2025-03-11 VITALS
HEART RATE: 101 BPM | HEIGHT: 66 IN | OXYGEN SATURATION: 99 % | SYSTOLIC BLOOD PRESSURE: 137 MMHG | BODY MASS INDEX: 26.36 KG/M2 | TEMPERATURE: 97.6 F | WEIGHT: 164 LBS | DIASTOLIC BLOOD PRESSURE: 87 MMHG | RESPIRATION RATE: 16 BRPM

## 2025-03-11 DIAGNOSIS — C56.2 BORDERLINE SEROUS CYSTADENOMA OF LEFT OVARY (H): Primary | ICD-10-CM

## 2025-03-11 DIAGNOSIS — C56.1 BORDERLINE SEROUS CYSTADENOMA OF RIGHT OVARY (H): ICD-10-CM

## 2025-03-11 PROCEDURE — 99214 OFFICE O/P EST MOD 30 MIN: CPT | Performed by: OBSTETRICS & GYNECOLOGY

## 2025-03-11 ASSESSMENT — PAIN SCALES - GENERAL: PAINLEVEL_OUTOF10: NO PAIN (0)

## 2025-03-11 NOTE — LETTER
3/11/2025      Joan Morales  [address] [place] [address]      Dear Colleague,    Thank you for referring your patient, Joan Morales, to the [facility]. Please see a copy of my visit note below.    Gynecologic Oncology Progress Note            RE: Joan Morales  : 1990  JOMAR: Mar 11, 2025      I had the pleasure of seeing your patient Joan Morales here at the Gynecologic Cancer Clinic at the [facility] on 3/11/2025.  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  Given these findings she was subsequently sent to the Gynecologic Cancer Clinic for new patient consultation.  She is accompanied today by her .    She has had a long course of infertility and endometriosis.  She has undergone several laparoscopies for fulguration of endometriosis, previously all pathology has been benign.  Most recently, however she underwent bilateral cystectomies which revealed borderline tumors of the bilateral ovaries with surfaces involvement on the right.  No pelvic washings were obtained.  She has 5 frozen day 3 embryo's with [facility] and is getting her infertility care in [place].  She has met with them and the infertility specialist is ok to proceed with embryo transfer if deemed appropriate.      24:  Laparoscopic bilateral ovarian cystectomy and bilateral tubal dye, left salpingectomy, cauterization of endometriosis with Dr Jennifer Schwab   Pathology:  Benign endometriotic cysts bilaterally    2/10/25:  Laparoscopic bilateral ovarian cystectomy with Dr Chapincito Riggs   Pathology:  Bilateral ovaries with serous borderline tumor, on the right there is focal surface involvement      Obstetrics and Gynecology History:  G0  She has decided that she is not interested in further oocyte retrieval but would like to attempt to use the embryo's she currently has  "frozen      Past Medical History:  Past Medical History:   Diagnosis Date     Gastroesophageal reflux disease      Ovarian tumor of borderline malignancy, unspecified laterality     Bilateral     Uncomplicated asthma        Past Surgical History:  Past Surgical History:   Procedure Laterality Date     LAPAROSCOPIC ABLATION ENDOMETRIOSIS  01/23/2024    Procedure: Laparoscopic ablation endometriosis;  Surgeon: Schwab, Jennifer Lani, MD;  Location:  OR     LAPAROSCOPIC CYSTECTOMY OVARIAN (BENIGN) Bilateral 01/23/2024    Procedure: laparoscopic bilateral ovarian cystectomy and bilateral tubal dye, LEFT SALPINGECTOMY, CAUTERIZATION OF ENDOMETRIOSIS;  Surgeon: Schwab, Jennifer Lani, MD;  Location:  OR     LAPAROSCOPIC SALPINGECTOMY Left 01/23/2024    Procedure: Laparoscopic salpingectomy;  Surgeon: Schwab, Jennifer Lani, MD;  Location:  OR     LAPAROSCOPIC TUBAL DYE STUDY Bilateral 01/23/2024    Procedure: and bilateral tubal dye study;  Surgeon: Schwab, Jennifer Lani, MD;  Location:  OR     ORTHOPEDIC SURGERY      broken arm     wisdom teeth extraction         Health Maintenance:  Last Pap Smear: 11/9/23              Result: Negative, HPV negative  She has not had a history of abnormal Pap smears.    Last Mammogram: N/A    Last Colonoscopy: N/A       Social History:  Lives with her spouse, feels safe at home.  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  [place] that [place] time [place].  Does not have an advanced directive on file and would like her Bakari to be her POA.  Full code.    Family History:   The patient's family history is significant for.  Family History   Problem Relation Age of Onset     No Known Problems Mother      No Known Problems Father      Hearing Loss Sister      Diabetes Brother      Lymphoma Maternal Grandmother          Physical Exam:   /87   Pulse 101   Temp 97.6  F (36.4  C) (Tympanic)   Resp 16   Ht 1.668 m (5' 5.65\")  "  Wt 74.4 kg (164 lb)   SpO2 99%   BMI 26.75 kg/m    GENERAL: alert and no distress  EYES: Eyes grossly normal to inspection.  No discharge or erythema, or obvious scleral/conjunctival abnormalities.  RESP: No audible wheeze, cough, or visible cyanosis.    SKIN: Visible skin clear. No significant rash, abnormal pigmentation or lesions.  NEURO: Cranial nerves grossly intact.  Mentation and speech appropriate for age.  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     A total of 60 minutes was spent on patient care today.       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  We discussed the nature of borderline tumors and the possible clinical courses they can take.  We discussed that they are a surgical problem and we do not typically give chemotherapy for borderline tumors.  We discussed the standard of care would be for a hysterectomy with removal of both ovaries and fallopian tubes with staging procedure (excluding lymph node evaluation), however given her age and desire for fertility we did also discuss other potential options.  We discussed possibility of a diagnostic laparoscopy to assess for evidence of metastatic disease and for a better visual assessment of the ovaries following her most recent procedure. We could then discuss my findings and she could make a more informed decision about how to ultimately proceed.  We also discussed proceeding with bilateral oophorectomy while retaining her uterus so that she would be eligible for embryo transfer.  We discussed that there are two  separate issues at play in terms of oophorectomy, fertility as well as hormonal.  We discussed the risks of premature menopause in terms of cardiovascular disease and osteoporosis as well as a myriad of other effects of premature menopause.  We discussed the scant data on safety of ERT in the setting of a borderline tumor and potential for increased recurrence rates.  We also discussed that there is little data on pregnancy outcomes or recurrence outcomes of borderline tumors during pregnancy.  There are many reports of successful pregnancy after fertility sparing approaches to borderline tumors. She and her  asked many thoughtful and informed questions.  They would like to consider these options further and were provided with the contact information for our clinic so they can reach out with questions at any time.     2.) Genetic risk factors were assessed and the patient already has undergone genetic testing and results are pending at this time.  She will provide those results when they become available    3.) Labs and/or tests ordered include:  none.     4.) Health maintenance issues addressed today include pt is up to date.          Thank you for allowing us to participate in the care of your patient.         Sincerely,    Asha Stover MD  Gynecologic Oncology  [facility]       CC  SELF, REFERRED      Again, thank you for allowing me to participate in the care of your patient.        Sincerely,        Justin Stover MD    Electronically signed

## 2025-03-11 NOTE — NURSING NOTE
"Oncology Rooming Note    March 11, 2025 8:31 AM   Joan Morales is a 34 year old female who presents for:    Chief Complaint   Patient presents with    Oncology Clinic Visit     Initial Vitals: /87   Pulse 101   Temp 97.6  F (36.4  C) (Tympanic)   Resp 16   Ht 1.668 m (5' 5.65\")   Wt 74.4 kg (164 lb)   SpO2 99%   BMI 26.75 kg/m   Estimated body mass index is 26.75 kg/m  as calculated from the following:    Height as of this encounter: 1.668 m (5' 5.65\").    Weight as of this encounter: 74.4 kg (164 lb). Body surface area is 1.86 meters squared.  No Pain (0) Comment: Data Unavailable   No LMP recorded.  Allergies reviewed: Yes  Medications reviewed: Yes    Medications: Medication refills not needed today.  Pharmacy name entered into [facility]: [id number] [place] [address]    Frailty Screening:   Is the patient here for a new oncology consult visit in cancer care? 2. No    PHQ9:  Did this patient require a PHQ9?: No      Clinical concerns: new patient       Asha Lester CMA              "

## 2025-03-14 ENCOUNTER — MYC MEDICAL ADVICE (OUTPATIENT)
Dept: ONCOLOGY | Facility: CLINIC | Age: 35
End: 2025-03-14
Payer: COMMERCIAL

## 2025-03-18 ENCOUNTER — VIRTUAL VISIT (OUTPATIENT)
Dept: ONCOLOGY | Facility: CLINIC | Age: 35
End: 2025-03-18
Attending: OBSTETRICS & GYNECOLOGY
Payer: COMMERCIAL

## 2025-03-18 VITALS — WEIGHT: 165 LBS | HEIGHT: 65 IN | BODY MASS INDEX: 27.49 KG/M2

## 2025-03-18 DIAGNOSIS — C56.2 BORDERLINE SEROUS CYSTADENOMA OF LEFT OVARY (H): Primary | ICD-10-CM

## 2025-03-18 DIAGNOSIS — C56.1 BORDERLINE SEROUS CYSTADENOMA OF RIGHT OVARY (H): ICD-10-CM

## 2025-03-18 PROCEDURE — 98006 SYNCH AUDIO-VIDEO EST MOD 30: CPT | Performed by: OBSTETRICS & GYNECOLOGY

## 2025-03-18 ASSESSMENT — PAIN SCALES - GENERAL: PAINLEVEL_OUTOF10: NO PAIN (0)

## 2025-03-18 NOTE — H&P (VIEW-ONLY)
Virtual Visit Details    Type of service:  Video Visit     Originating Location (pt. 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  She is unaccompanied today.    She has had a long course of infertility and endometriosis.  She has undergone several laparoscopies for fulguration of endometriosis, previously all pathology has been benign.  Most recently, however she underwent bilateral cystectomies which revealed borderline tumors of the bilateral ovaries with surfaces involvement on the right.  No pelvic washings were obtained.  She has 5 frozen day 3 embryo's with [facility] and is getting her infertility care in [place].  She has met with them and the infertility specialist is ok to proceed with embryo transfer if deemed appropriate.      24:  Laparoscopic bilateral ovarian cystectomy and bilateral tubal dye, left salpingectomy, cauterization of endometriosis with Dr Jennifer Schwab   Pathology:  Benign endometriotic cysts bilaterally    2/10/25:  Laparoscopic bilateral ovarian cystectomy with Dr Chapincito Riggs   Pathology:  Bilateral ovaries with serous borderline tumor, on the right there is focal surface involvement      Obstetrics and Gynecology History:  G0  She has decided that she is not interested in further oocyte retrieval but would like to attempt to use the embryo's she currently has frozen      Past Medical History:  Past Medical History:   Diagnosis Date    Ovarian tumor of borderline malignancy, unspecified laterality     Bilateral    Uncomplicated asthma        Past Surgical History:  Past Surgical History:   Procedure Laterality Date    LAPAROSCOPIC ABLATION ENDOMETRIOSIS  2024    Procedure: Laparoscopic ablation  endometriosis;  Surgeon: Schwab, Jennifer Lani, MD;  Location: [facility]    LAPAROSCOPIC CYSTECTOMY OVARIAN (BENIGN) Bilateral 01/23/2024    Procedure: laparoscopic bilateral ovarian cystectomy and bilateral tubal dye, LEFT SALPINGECTOMY, CAUTERIZATION OF ENDOMETRIOSIS;  Surgeon: Schwab, Jennifer Lani, MD;  Location: [facility]    LAPAROSCOPIC SALPINGECTOMY Left 01/23/2024    Procedure: Laparoscopic salpingectomy;  Surgeon: Schwab, Jennifer Lani, MD;  Location: [facility]    LAPAROSCOPIC TUBAL DYE STUDY Bilateral 01/23/2024    Procedure: and bilateral tubal dye study;  Surgeon: Schwab, Jennifer Lani, MD;  Location:  OR    ORTHOPEDIC SURGERY      broken arm    wisdom teeth extraction         Health Maintenance:  Last Pap Smear: 11/9/23              Result: Negative, HPV negative  She has not had a history of abnormal Pap smears.    Last Mammogram: N/A    Last Colonoscopy: N/A       Social History:  Lives with her spouse, feels safe at home.  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  [place] that [place] time [place].  Does not have an advanced directive on file and would like her Bakari to be her POA.  Full code.    Family History:   The patient's family history is significant for.  Family History   Problem Relation Age of Onset    No Known Problems Mother     No Known Problems Father     Hearing Loss Sister     Diabetes Brother     Lymphoma Maternal Grandmother          Physical Exam:   There were no vitals taken for this visit.  GENERAL: alert and no distress  EYES: Eyes grossly normal to inspection.  No discharge or erythema, or obvious scleral/conjunctival abnormalities.  RESP: No audible wheeze, cough, or visible cyanosis.    SKIN: Visible skin clear. No significant rash, abnormal pigmentation or lesions.  NEURO: Cranial nerves grossly intact.  Mentation and speech appropriate for age.  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     A total of 30 minutes was spent on patient care today.       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  She is leaning towards bilateral oophorectomy and will discuss further with her  and let me know how she would like to proceed.     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     4.) Health maintenance issues addressed today include pt is up to date.          Thank you for allowing us to participate in the care of your patient.         Sincerely,    Asha Stover MD  Gynecologic Oncology  [facility]       CC  SELF, REFERRED

## 2025-03-18 NOTE — NURSING NOTE
Current patient location: [address] [place] [id number]    Is the patient currently in the state of [place]? YES    Visit mode: VIDEO    If the visit is dropped, the patient can be reconnected by:VIDEO VISIT: Text to cell phone:   Telephone Information:   Mobile [phone]       Will anyone else be joining the visit? NO  (If patient encounters technical issues they should call [phone]    Are changes needed to the allergy or medication list? Pt completed echeck-in an confirms medications and allergies are correct.      Are refills needed on medications prescribed by this physician? NO    Rooming Documentation:  Questionnaire(s) completed    Reason for visit: JOSE MIGUEL HELTON

## 2025-03-18 NOTE — LETTER
3/18/2025      Joan Morales  [address] [facility] [address]      Dear Colleague,    Thank you for referring your patient, Joan Morales, to the [facility]. Please see a copy of my visit note below.    Virtual Visit Details    Type of service:  Video Visit     Originating Location (pt. Location): Home  {PROVIDER LOCATION On-site should be selected for visits conducted from your clinic location or adjoining [facility] hospital, academic office, or other nearby [facility] building. 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  She is unaccompanied today.    She has had a long course of infertility and endometriosis.  She has undergone several laparoscopies for fulguration of endometriosis, previously all pathology has been benign.  Most recently, however she underwent bilateral cystectomies which revealed borderline tumors of the bilateral ovaries with surfaces involvement on the right.  No pelvic washings were obtained.  She has 5 frozen day 3 embryo's with [facility] and is getting her infertility care in [place].  She has met with them and the infertility specialist is ok to proceed with embryo transfer if deemed appropriate.      24:  Laparoscopic bilateral ovarian cystectomy and bilateral tubal dye, left salpingectomy, cauterization of endometriosis with Dr Jennifer Schwab   Pathology:  Benign endometriotic cysts bilaterally    2/10/25:  Laparoscopic bilateral ovarian cystectomy with Dr Chapincito Riggs   Pathology:  Bilateral ovaries with serous borderline tumor, on the right there is focal surface involvement      Obstetrics and Gynecology  History:  G0  She has decided that she is not interested in further oocyte retrieval but would like to attempt to use the embryo's she currently has frozen      Past Medical History:  Past Medical History:   Diagnosis Date     Ovarian tumor of borderline malignancy, unspecified laterality     Bilateral     Uncomplicated asthma        Past Surgical History:  Past Surgical History:   Procedure Laterality Date     LAPAROSCOPIC ABLATION ENDOMETRIOSIS  01/23/2024    Procedure: Laparoscopic ablation endometriosis;  Surgeon: Schwab, Jennifer Lani, MD;  Location:  OR     LAPAROSCOPIC CYSTECTOMY OVARIAN (BENIGN) Bilateral 01/23/2024    Procedure: laparoscopic bilateral ovarian cystectomy and bilateral tubal dye, LEFT SALPINGECTOMY, CAUTERIZATION OF ENDOMETRIOSIS;  Surgeon: Schwab, Jennifer Lani, MD;  Location: [facility] OR     LAPAROSCOPIC SALPINGECTOMY Left 01/23/2024    Procedure: Laparoscopic salpingectomy;  Surgeon: Schwab, Jennifer Lani, MD;  Location:  OR     LAPAROSCOPIC TUBAL DYE STUDY Bilateral 01/23/2024    Procedure: and bilateral tubal dye study;  Surgeon: Schwab, Jennifer Lani, MD;  Location:  OR     ORTHOPEDIC SURGERY      broken arm     wisdom teeth extraction         Health Maintenance:  Last Pap Smear: 11/9/23              Result: Negative, HPV negative  She has not had a history of abnormal Pap smears.    Last Mammogram: N/A    Last Colonoscopy: N/A       Social History:  Lives with her spouse, feels safe at home.  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  [place].  Does not have an advanced directive on file and would like her , Bakari to be her POA.  Full code.    Family History:   The patient's family history is significant for.  Family History   Problem Relation Age of Onset     No Known Problems Mother      No Known Problems Father      Hearing Loss Sister      Diabetes Brother      Lymphoma Maternal Grandmother           Physical Exam:   There were no vitals taken for this visit.  GENERAL: alert and no distress  EYES: Eyes grossly normal to inspection.  No discharge or erythema, or obvious scleral/conjunctival abnormalities.  RESP: No audible wheeze, cough, or visible cyanosis.    SKIN: Visible skin clear. No significant rash, abnormal pigmentation or lesions.  NEURO: Cranial nerves grossly intact.  Mentation and speech appropriate for age.  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     A total of 30 minutes was spent on patient care today.       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  She is leaning towards bilateral oophorectomy and will discuss further with her  and let me know how she would like to proceed.     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     4.) Health maintenance issues addressed today include pt is up to date.          Thank you for allowing us to participate in the care of your patient.         Sincerely,    Asha Stover MD  Gynecologic Oncology  [facility]       CC  SELF, REFERRED      Again, thank you for allowing me to participate in the care of your patient.        Sincerely,        Justin Stover MD    Electronically signed

## 2025-03-18 NOTE — PROGRESS NOTES
Virtual Visit Details    Type of service:  Video Visit     Originating Location (pt. 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  She is unaccompanied today.    She has had a long course of infertility and endometriosis.  She has undergone several laparoscopies for fulguration of endometriosis, previously all pathology has been benign.  Most recently, however she underwent bilateral cystectomies which revealed borderline tumors of the bilateral ovaries with surfaces involvement on the right.  No pelvic washings were obtained.  She has 5 frozen day 3 embryo's with [facility] and is getting her infertility care in [place].  She has met with them and the infertility specialist is ok to proceed with embryo transfer if deemed appropriate.      24:  Laparoscopic bilateral ovarian cystectomy and bilateral tubal dye, left salpingectomy, cauterization of endometriosis with Dr Jennifer Schwab   Pathology:  Benign endometriotic cysts bilaterally    2/10/25:  Laparoscopic bilateral ovarian cystectomy with Dr Chapincito Riggs   Pathology:  Bilateral ovaries with serous borderline tumor, on the right there is focal surface involvement      Obstetrics and Gynecology History:  G0  She has decided that she is not interested in further oocyte retrieval but would like to attempt to use the embryo's she currently has frozen      Past Medical History:  Past Medical History:   Diagnosis Date    Ovarian tumor of borderline malignancy, unspecified laterality     Bilateral    Uncomplicated asthma        Past Surgical History:  Past Surgical History:   Procedure Laterality Date    LAPAROSCOPIC ABLATION ENDOMETRIOSIS  2024    Procedure: Laparoscopic ablation  endometriosis;  Surgeon: Schwab, Jennifer Lani, MD;  Location: [facility] OR    LAPAROSCOPIC CYSTECTOMY OVARIAN (BENIGN) Bilateral 01/23/2024    Procedure: laparoscopic bilateral ovarian cystectomy and bilateral tubal dye, LEFT SALPINGECTOMY, CAUTERIZATION OF ENDOMETRIOSIS;  Surgeon: Schwab, Jennifer Lani, MD;  Location: [facility] OR    LAPAROSCOPIC SALPINGECTOMY Left 01/23/2024    Procedure: Laparoscopic salpingectomy;  Surgeon: Schwab, Jennifer Lani, MD;  Location: [facility]    LAPAROSCOPIC TUBAL DYE STUDY Bilateral 01/23/2024    Procedure: and bilateral tubal dye study;  Surgeon: Schwab, Jennifer Lani, MD;  Location:  OR    ORTHOPEDIC SURGERY      broken arm    wisdom teeth extraction         Health Maintenance:  Last Pap Smear: 11/9/23              Result: Negative, HPV negative  She has not had a history of abnormal Pap smears.    Last Mammogram: N/A    Last Colonoscopy: N/A       Social History:  Lives with her spouse, feels safe at home.  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  [place] that [place].  Does not have an advanced directive on file and would like her Bakari to be her POA.  Full code.    Family History:   The patient's family history is significant for.  Family History   Problem Relation Age of Onset    No Known Problems Mother     No Known Problems Father     Hearing Loss Sister     Diabetes Brother     Lymphoma Maternal Grandmother          Physical Exam:   There were no vitals taken for this visit.  GENERAL: alert and no distress  EYES: Eyes grossly normal to inspection.  No discharge or erythema, or obvious scleral/conjunctival abnormalities.  RESP: No audible wheeze, cough, or visible cyanosis.    SKIN: Visible skin clear. No significant rash, abnormal pigmentation or lesions.  NEURO: Cranial nerves grossly intact.  Mentation and speech appropriate for age.  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     A total of 30 minutes was spent on patient care today.       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  She is leaning towards bilateral oophorectomy and will discuss further with her  and let me know how she would like to proceed.     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     4.) Health maintenance issues addressed today include pt is up to date.          Thank you for allowing us to participate in the care of your patient.         Sincerely,    Asha Stover MD  Gynecologic Oncology  [facility]       CC  SELF, REFERRED

## 2025-03-18 NOTE — LETTER
3/18/2025      Joan Morales  [address] [address]      Dear Colleague,    Thank you for referring your patient, Joan Morales, to the [facility]. Please see a copy of my visit note below.    Virtual Visit Details    Type of service:  Video Visit     Originating Location (pt. Location): Home  {PROVIDER LOCATION On-site should be selected for visits conducted from your clinic location or adjoining [facility] hospital, academic office, or other nearby [facility] building. 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  She is unaccompanied today.    She has had a long course of infertility and endometriosis.  She has undergone several laparoscopies for fulguration of endometriosis, previously all pathology has been benign.  Most recently, however she underwent bilateral cystectomies which revealed borderline tumors of the bilateral ovaries with surfaces involvement on the right.  No pelvic washings were obtained.  She has 5 frozen day 3 embryo's with [facility] and is getting her infertility care in [place].  She has met with them and the infertility specialist is ok to proceed with embryo transfer if deemed appropriate.      24:  Laparoscopic bilateral ovarian cystectomy and bilateral tubal dye, left salpingectomy, cauterization of endometriosis with Dr Jennifer Schwab   Pathology:  Benign endometriotic cysts bilaterally    2/10/25:  Laparoscopic bilateral ovarian cystectomy with Dr Chapincito Riggs   Pathology:  Bilateral ovaries with serous borderline tumor, on the right there is focal surface involvement      Obstetrics and Gynecology  History:  G0  She has decided that she is not interested in further oocyte retrieval but would like to attempt to use the embryo's she currently has frozen      Past Medical History:  Past Medical History:   Diagnosis Date     Ovarian tumor of borderline malignancy, unspecified laterality     Bilateral     Uncomplicated asthma        Past Surgical History:  Past Surgical History:   Procedure Laterality Date     LAPAROSCOPIC ABLATION ENDOMETRIOSIS  01/23/2024    Procedure: Laparoscopic ablation endometriosis;  Surgeon: Schwab, Jennifer Lani, MD;  Location:  OR     LAPAROSCOPIC CYSTECTOMY OVARIAN (BENIGN) Bilateral 01/23/2024    Procedure: laparoscopic bilateral ovarian cystectomy and bilateral tubal dye, LEFT SALPINGECTOMY, CAUTERIZATION OF ENDOMETRIOSIS;  Surgeon: Schwab, Jennifer Lani, MD;  Location: [facility] OR     LAPAROSCOPIC SALPINGECTOMY Left 01/23/2024    Procedure: Laparoscopic salpingectomy;  Surgeon: Schwab, Jennifer Lani, MD;  Location:  OR     LAPAROSCOPIC TUBAL DYE STUDY Bilateral 01/23/2024    Procedure: and bilateral tubal dye study;  Surgeon: Schwab, Jennifer Lani, MD;  Location:  OR     ORTHOPEDIC SURGERY      broken arm     wisdom teeth extraction         Health Maintenance:  Last Pap Smear: 11/9/23              Result: Negative, HPV negative  She has not had a history of abnormal Pap smears.    Last Mammogram: N/A    Last Colonoscopy: N/A       Social History:  Lives with her spouse, feels safe at home.  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  [place] that [place] time [place].  Does not have an advanced directive on file and would like her , Bakari to be her POA.  Full code.    Family History:   The patient's family history is significant for.  Family History   Problem Relation Age of Onset     No Known Problems Mother      No Known Problems Father      Hearing Loss Sister      Diabetes Brother      Lymphoma Maternal Grandmother           Physical Exam:   There were no vitals taken for this visit.  GENERAL: alert and no distress  EYES: Eyes grossly normal to inspection.  No discharge or erythema, or obvious scleral/conjunctival abnormalities.  RESP: No audible wheeze, cough, or visible cyanosis.    SKIN: Visible skin clear. No significant rash, abnormal pigmentation or lesions.  NEURO: Cranial nerves grossly intact.  Mentation and speech appropriate for age.  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     A total of 30 minutes was spent on patient care today.       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  She is leaning towards bilateral oophorectomy and will discuss further with her  and let me know how she would like to proceed.     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     4.) Health maintenance issues addressed today include pt is up to date.          Thank you for allowing us to participate in the care of your patient.         Sincerely,    Asha Stover MD  Gynecologic Oncology  [facility]       CC  SELF, REFERRED      Again, thank you for allowing me to participate in the care of your patient.        Sincerely,        Justin Stover MD    Electronically signed

## 2025-03-19 RX ORDER — PHENAZOPYRIDINE HYDROCHLORIDE 100 MG/1
200 TABLET, FILM COATED ORAL ONCE
OUTPATIENT
Start: 2025-03-19 | End: 2025-03-19

## 2025-03-19 RX ORDER — HEPARIN SODIUM 10000 [USP'U]/ML
5000 INJECTION, SOLUTION INTRAVENOUS; SUBCUTANEOUS
OUTPATIENT
Start: 2025-03-19

## 2025-03-20 ENCOUNTER — TELEPHONE (OUTPATIENT)
Dept: ONCOLOGY | Facility: CLINIC | Age: 35
End: 2025-03-20
Payer: COMMERCIAL

## 2025-03-20 NOTE — TELEPHONE ENCOUNTER
Called patient to schedule surgery with Dr. Stover.     Spoke with:  Joan (patient)    Date of Surgery: 4/9/2025    Arrival time Discussed with Patient:  No    Location of surgery: [facility] OR     Pre-Op H&P: Completed with Dr. Stover on 3/18    Post-Op Appts: To be scheduled by [facility]     Imaging: NA    Discussed with patient pre-op RN will call 2-3 days prior to surgery with arrival time and instructions:  Yes     Packet sent out: Sending via [employer] 3/20    Surgical Soap: Receiving via local clinic or pharmacy.      Informed patient that they will need an adult  to bring patient home from surgery: Yes  : Patient has been made aware to have a  available after surgery.        Additional Comments: None      All patients questions were answered and patient was instructed to review surgical packet and call back with any questions or concerns.       Chiara Lora on 3/20/2025 at 9:46 AM

## 2025-03-26 ENCOUNTER — PATIENT OUTREACH (OUTPATIENT)
Dept: ONCOLOGY | Facility: CLINIC | Age: 35
End: 2025-03-26
Payer: COMMERCIAL

## 2025-03-26 NOTE — PROGRESS NOTES
Dr Stover notified and okay to delay surgery one week. Patient is rescheduled for 4/16/25 and patient called per surgery scheduler.    Rosibel Bass, RN, BSN, OCN

## 2025-03-26 NOTE — TELEPHONE ENCOUNTER
Received call from patient who states she is scheduled for surgery with Dr Stover on 25. Patient's grandfather just passed away and his  is on 25. Patient asking if she would be feeling well enough three days later or can surgery be delayed one week?    Will notify Dr Stover with call and see if 25 is an option for surgery. Writer will call back in the next few days with update, patient aware.    Rosibel Bass RN, BSN, OCN

## 2025-03-31 ENCOUNTER — MYC MEDICAL ADVICE (OUTPATIENT)
Dept: ONCOLOGY | Facility: CLINIC | Age: 35
End: 2025-03-31
Payer: COMMERCIAL

## 2025-04-02 ENCOUNTER — PATIENT OUTREACH (OUTPATIENT)
Dept: ONCOLOGY | Facility: CLINIC | Age: 35
End: 2025-04-02
Payer: COMMERCIAL

## 2025-04-02 NOTE — TELEPHONE ENCOUNTER
Called patient to review surgical education. Patient received surgery instructions per mail.     GYN ONC Pre-Op Education - Nursing     Relevant Diagnosis: Serous borderline tumor of bilateral ovaries    Teaching Topic: Pelvic exam under anesthesia, laparoscopic removal of both ovaries and fallopian tubes, possible cancer surgery with Dr Stover on 4/16/25.    Teaching Concerns addressed: Yes    Patient demonstrates understanding of the following:   Reason for the appointment, diagnosis and treatment plan:   Yes   Knowledge of proper use of medications and conditions for which they are ordered (with special attention to potential side effects or drug interactions): Yes   Which situations necessitate calling provider and whom to contact: Yes       Nutritional needs and diet plan:  Yes      Pain management techniques:  Yes  Diet:  Yes     Infection Prevention:  Patient demonstrates understanding of the following:   Pre-Op CHG Bathing Instructions: Yes  Surgical procedure site care taught:   Yes   Signs and symptoms of infection taught: Yes     Instructional Materials Used/Given:  [facility] Preparing for Your Surgery  Showering or Bathing before Surgery Instructions & CHG Product (2 bottles)  Home Care after Major Abdominal or Vaginal Surgery  Map  Tips to Increase the Protein in Your Diet  Phone number for [facility]     Comments:  Reviewed NPO restrictions prior to surgery with pt (No food or milk products 8 hours prior to arrival of surgery; Only clear liquids up to 2 hours prior to arrival of surgery i.e., water, black coffee, tea, apple juice).  Also reviewed medications that must be held for at least 7 days prior to surgery (Aspirin, Ibuprofen, Naproxen, Fish oil/Flax seed oil, Multivitamin/Vit. E, or any other product containing aspirin, or NSAIDs). Patient does not take routine medications other than vitamin supplements so no medication to take on the morning of surgery.  Pt will need to see   Ck , 2-3 weeks after her surgery. Pt will need someone to drive her home after surgery, and someone to stay with her for at least 24 hours after she arrives home. Reviewed with pt signs and symptoms that would warrant contacting provider immediately.  Also reviewed lifting restrictions after surgery with patient, no heavy lifting > 20 pounds for six weeks and nothing inside the vagina, no tampons, intercourse, or douching for at least two weeks and then patient instructed to discuss further with MD at post-op appointment.  Discussed FMLA and patient states she will not need forms completed for time off. 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 Pt had the opportunity to ask questions, and all questions were answered to her satisfaction.  Patient verbalized understanding and agreement with plan.  Pt was instructed to call the clinic with any questions, concerns, or worsening symptoms.     Rosibel Bass RN, BSN, OCN

## 2025-04-16 ENCOUNTER — ANESTHESIA (OUTPATIENT)
Dept: SURGERY | Facility: CLINIC | Age: 35
End: 2025-04-16
Payer: COMMERCIAL

## 2025-04-16 ENCOUNTER — ANESTHESIA EVENT (OUTPATIENT)
Dept: SURGERY | Facility: CLINIC | Age: 35
End: 2025-04-16
Payer: COMMERCIAL

## 2025-04-16 ENCOUNTER — HOSPITAL ENCOUNTER (OUTPATIENT)
Facility: CLINIC | Age: 35
Discharge: HOME | End: 2025-04-16
Attending: OBSTETRICS & GYNECOLOGY | Admitting: OBSTETRICS & GYNECOLOGY
Payer: COMMERCIAL

## 2025-04-16 VITALS
WEIGHT: 165 LBS | SYSTOLIC BLOOD PRESSURE: 124 MMHG | HEART RATE: 101 BPM | BODY MASS INDEX: 27.49 KG/M2 | TEMPERATURE: 97.9 F | DIASTOLIC BLOOD PRESSURE: 76 MMHG | HEIGHT: 65 IN | OXYGEN SATURATION: 97 % | RESPIRATION RATE: 16 BRPM

## 2025-04-16 DIAGNOSIS — Z98.890 S/P LAPAROSCOPY: ICD-10-CM

## 2025-04-16 DIAGNOSIS — Z90.722 S/P BSO (BILATERAL SALPINGO-OOPHORECTOMY): Primary | ICD-10-CM

## 2025-04-16 LAB
ABO + RH BLD: NORMAL
ALBUMIN SERPL BCG-MCNC: 4.4 G/DL (ref 3.5–5.2)
ALP SERPL-CCNC: 69 U/L (ref 40–150)
ALT SERPL W P-5'-P-CCNC: 12 U/L (ref 0–50)
ANION GAP SERPL CALCULATED.3IONS-SCNC: 10 MMOL/L (ref 7–15)
AST SERPL W P-5'-P-CCNC: 14 U/L (ref 0–45)
BASOPHILS # BLD AUTO: 0 10E3/UL (ref 0–0.2)
BASOPHILS NFR BLD AUTO: 0 %
BILIRUB SERPL-MCNC: 0.3 MG/DL
BLD GP AB SCN SERPL QL: NEGATIVE
BUN SERPL-MCNC: 16.3 MG/DL (ref 6–20)
CALCIUM SERPL-MCNC: 9.2 MG/DL (ref 8.8–10.4)
CHLORIDE SERPL-SCNC: 103 MMOL/L (ref 98–107)
CREAT SERPL-MCNC: 0.76 MG/DL (ref 0.51–0.95)
EGFRCR SERPLBLD CKD-EPI 2021: >90 ML/MIN/1.73M2
EOSINOPHIL # BLD AUTO: 0.1 10E3/UL (ref 0–0.7)
EOSINOPHIL NFR BLD AUTO: 2 %
ERYTHROCYTE [DISTWIDTH] IN BLOOD BY AUTOMATED COUNT: 11.4 % (ref 10–15)
GLUCOSE SERPL-MCNC: 95 MG/DL (ref 70–99)
HCG UR QL: NEGATIVE
HCO3 SERPL-SCNC: 28 MMOL/L (ref 22–29)
HCT VFR BLD AUTO: 38.1 % (ref 35–47)
HGB BLD-MCNC: 13.3 G/DL (ref 11.7–15.7)
IMM GRANULOCYTES # BLD: 0 10E3/UL
IMM GRANULOCYTES NFR BLD: 0 %
LYMPHOCYTES # BLD AUTO: 1.7 10E3/UL (ref 0.8–5.3)
LYMPHOCYTES NFR BLD AUTO: 29 %
MCH RBC QN AUTO: 33.8 PG (ref 26.5–33)
MCHC RBC AUTO-ENTMCNC: 34.9 G/DL (ref 31.5–36.5)
MCV RBC AUTO: 97 FL (ref 78–100)
MONOCYTES # BLD AUTO: 0.4 10E3/UL (ref 0–1.3)
MONOCYTES NFR BLD AUTO: 6 %
NEUTROPHILS # BLD AUTO: 3.6 10E3/UL (ref 1.6–8.3)
NEUTROPHILS NFR BLD AUTO: 62 %
NRBC # BLD AUTO: 0 10E3/UL
NRBC BLD AUTO-RTO: 0 /100
PLATELET # BLD AUTO: 338 10E3/UL (ref 150–450)
POTASSIUM SERPL-SCNC: 3.5 MMOL/L (ref 3.4–5.3)
PROT SERPL-MCNC: 7.3 G/DL (ref 6.4–8.3)
RBC # BLD AUTO: 3.93 10E6/UL (ref 3.8–5.2)
SODIUM SERPL-SCNC: 141 MMOL/L (ref 135–145)
SPECIMEN EXP DATE BLD: NORMAL
WBC # BLD AUTO: 5.8 10E3/UL (ref 4–11)

## 2025-04-16 PROCEDURE — 250N000013 HC RX MED GY IP 250 OP 250 PS 637: Performed by: OBSTETRICS & GYNECOLOGY

## 2025-04-16 PROCEDURE — 250N000011 HC RX IP 250 OP 636: Performed by: OBSTETRICS & GYNECOLOGY

## 2025-04-16 PROCEDURE — 85004 AUTOMATED DIFF WBC COUNT: CPT | Performed by: OBSTETRICS & GYNECOLOGY

## 2025-04-16 PROCEDURE — 370N000017 HC ANESTHESIA TECHNICAL FEE, PER MIN: Performed by: OBSTETRICS & GYNECOLOGY

## 2025-04-16 PROCEDURE — 250N000011 HC RX IP 250 OP 636: Mod: JZ | Performed by: ANESTHESIOLOGY

## 2025-04-16 PROCEDURE — 250N000011 HC RX IP 250 OP 636: Performed by: REGISTERED NURSE

## 2025-04-16 PROCEDURE — 272N000001 HC OR GENERAL SUPPLY STERILE: Performed by: OBSTETRICS & GYNECOLOGY

## 2025-04-16 PROCEDURE — 999N000141 HC STATISTIC PRE-PROCEDURE NURSING ASSESSMENT: Performed by: OBSTETRICS & GYNECOLOGY

## 2025-04-16 PROCEDURE — 88305 TISSUE EXAM BY PATHOLOGIST: CPT | Mod: 26 | Performed by: PATHOLOGY

## 2025-04-16 PROCEDURE — 710N000009 HC RECOVERY PHASE 1, LEVEL 1, PER MIN: Performed by: OBSTETRICS & GYNECOLOGY

## 2025-04-16 PROCEDURE — 250N000009 HC RX 250: Performed by: REGISTERED NURSE

## 2025-04-16 PROCEDURE — 258N000003 HC RX IP 258 OP 636: Performed by: REGISTERED NURSE

## 2025-04-16 PROCEDURE — 80053 COMPREHEN METABOLIC PANEL: CPT | Performed by: OBSTETRICS & GYNECOLOGY

## 2025-04-16 PROCEDURE — 250N000009 HC RX 250: Performed by: OBSTETRICS & GYNECOLOGY

## 2025-04-16 PROCEDURE — 88307 TISSUE EXAM BY PATHOLOGIST: CPT | Mod: TC | Performed by: OBSTETRICS & GYNECOLOGY

## 2025-04-16 PROCEDURE — 36415 COLL VENOUS BLD VENIPUNCTURE: CPT | Performed by: OBSTETRICS & GYNECOLOGY

## 2025-04-16 PROCEDURE — 86900 BLOOD TYPING SEROLOGIC ABO: CPT | Performed by: OBSTETRICS & GYNECOLOGY

## 2025-04-16 PROCEDURE — 258N000003 HC RX IP 258 OP 636: Performed by: ANESTHESIOLOGY

## 2025-04-16 PROCEDURE — 360N000076 HC SURGERY LEVEL 3, PER MIN: Performed by: OBSTETRICS & GYNECOLOGY

## 2025-04-16 PROCEDURE — 88305 TISSUE EXAM BY PATHOLOGIST: CPT | Mod: TC | Performed by: OBSTETRICS & GYNECOLOGY

## 2025-04-16 PROCEDURE — 81025 URINE PREGNANCY TEST: CPT | Performed by: OBSTETRICS & GYNECOLOGY

## 2025-04-16 PROCEDURE — 710N000012 HC RECOVERY PHASE 2, PER MINUTE: Performed by: OBSTETRICS & GYNECOLOGY

## 2025-04-16 PROCEDURE — 250N000025 HC SEVOFLURANE, PER MIN: Performed by: OBSTETRICS & GYNECOLOGY

## 2025-04-16 RX ORDER — MAGNESIUM HYDROXIDE 1200 MG/15ML
LIQUID ORAL PRN
Status: DISCONTINUED | OUTPATIENT
Start: 2025-04-16 | End: 2025-04-16 | Stop reason: HOSPADM

## 2025-04-16 RX ORDER — ONDANSETRON 2 MG/ML
INJECTION INTRAMUSCULAR; INTRAVENOUS PRN
Status: DISCONTINUED | OUTPATIENT
Start: 2025-04-16 | End: 2025-04-16

## 2025-04-16 RX ORDER — IBUPROFEN 400 MG/1
800 TABLET, FILM COATED ORAL ONCE
Status: COMPLETED | OUTPATIENT
Start: 2025-04-16 | End: 2025-04-16

## 2025-04-16 RX ORDER — NALOXONE HYDROCHLORIDE 0.4 MG/ML
0.1 INJECTION, SOLUTION INTRAMUSCULAR; INTRAVENOUS; SUBCUTANEOUS
Status: DISCONTINUED | OUTPATIENT
Start: 2025-04-16 | End: 2025-04-16 | Stop reason: HOSPADM

## 2025-04-16 RX ORDER — DEXAMETHASONE SODIUM PHOSPHATE 4 MG/ML
4 INJECTION, SOLUTION INTRA-ARTICULAR; INTRALESIONAL; INTRAMUSCULAR; INTRAVENOUS; SOFT TISSUE
Status: DISCONTINUED | OUTPATIENT
Start: 2025-04-16 | End: 2025-04-16 | Stop reason: HOSPADM

## 2025-04-16 RX ORDER — OXYCODONE HYDROCHLORIDE 5 MG/1
5 TABLET ORAL
Status: DISCONTINUED | OUTPATIENT
Start: 2025-04-16 | End: 2025-04-16 | Stop reason: HOSPADM

## 2025-04-16 RX ORDER — ONDANSETRON 4 MG/1
4 TABLET, ORALLY DISINTEGRATING ORAL EVERY 30 MIN PRN
Status: DISCONTINUED | OUTPATIENT
Start: 2025-04-16 | End: 2025-04-16 | Stop reason: HOSPADM

## 2025-04-16 RX ORDER — PROPOFOL 10 MG/ML
INJECTION, EMULSION INTRAVENOUS CONTINUOUS PRN
Status: DISCONTINUED | OUTPATIENT
Start: 2025-04-16 | End: 2025-04-16

## 2025-04-16 RX ORDER — HYDROMORPHONE HCL IN WATER/PF 6 MG/30 ML
0.2 PATIENT CONTROLLED ANALGESIA SYRINGE INTRAVENOUS EVERY 5 MIN PRN
Status: DISCONTINUED | OUTPATIENT
Start: 2025-04-16 | End: 2025-04-16 | Stop reason: HOSPADM

## 2025-04-16 RX ORDER — FENTANYL CITRATE 0.05 MG/ML
50 INJECTION, SOLUTION INTRAMUSCULAR; INTRAVENOUS EVERY 5 MIN PRN
Status: DISCONTINUED | OUTPATIENT
Start: 2025-04-16 | End: 2025-04-16 | Stop reason: HOSPADM

## 2025-04-16 RX ORDER — ACETAMINOPHEN 325 MG/1
650 TABLET ORAL EVERY 6 HOURS PRN
Qty: 60 TABLET | Refills: 0 | Status: SHIPPED | OUTPATIENT
Start: 2025-04-16

## 2025-04-16 RX ORDER — OXYCODONE HYDROCHLORIDE 5 MG/1
5 TABLET ORAL EVERY 6 HOURS PRN
Qty: 6 TABLET | Refills: 0 | Status: SHIPPED | OUTPATIENT
Start: 2025-04-16 | End: 2025-04-19

## 2025-04-16 RX ORDER — HEPARIN SODIUM 5000 [USP'U]/.5ML
5000 INJECTION, SOLUTION INTRAVENOUS; SUBCUTANEOUS
Status: COMPLETED | OUTPATIENT
Start: 2025-04-16 | End: 2025-04-16

## 2025-04-16 RX ORDER — THERA TABS 400 MCG
1 TAB ORAL DAILY
COMMUNITY

## 2025-04-16 RX ORDER — OXYCODONE HYDROCHLORIDE 5 MG/1
10 TABLET ORAL
Status: DISCONTINUED | OUTPATIENT
Start: 2025-04-16 | End: 2025-04-16 | Stop reason: HOSPADM

## 2025-04-16 RX ORDER — FENTANYL CITRATE 0.05 MG/ML
25 INJECTION, SOLUTION INTRAMUSCULAR; INTRAVENOUS EVERY 5 MIN PRN
Status: DISCONTINUED | OUTPATIENT
Start: 2025-04-16 | End: 2025-04-16 | Stop reason: HOSPADM

## 2025-04-16 RX ORDER — PHENAZOPYRIDINE HYDROCHLORIDE 200 MG/1
200 TABLET, FILM COATED ORAL ONCE
Status: COMPLETED | OUTPATIENT
Start: 2025-04-16 | End: 2025-04-16

## 2025-04-16 RX ORDER — HYDROMORPHONE HCL IN WATER/PF 6 MG/30 ML
0.4 PATIENT CONTROLLED ANALGESIA SYRINGE INTRAVENOUS EVERY 5 MIN PRN
Status: DISCONTINUED | OUTPATIENT
Start: 2025-04-16 | End: 2025-04-16 | Stop reason: HOSPADM

## 2025-04-16 RX ORDER — DEXAMETHASONE SODIUM PHOSPHATE 4 MG/ML
INJECTION, SOLUTION INTRA-ARTICULAR; INTRALESIONAL; INTRAMUSCULAR; INTRAVENOUS; SOFT TISSUE PRN
Status: DISCONTINUED | OUTPATIENT
Start: 2025-04-16 | End: 2025-04-16

## 2025-04-16 RX ORDER — ONDANSETRON 2 MG/ML
4 INJECTION INTRAMUSCULAR; INTRAVENOUS EVERY 30 MIN PRN
Status: DISCONTINUED | OUTPATIENT
Start: 2025-04-16 | End: 2025-04-16 | Stop reason: HOSPADM

## 2025-04-16 RX ORDER — FENTANYL CITRATE 50 UG/ML
INJECTION, SOLUTION INTRAMUSCULAR; INTRAVENOUS PRN
Status: DISCONTINUED | OUTPATIENT
Start: 2025-04-16 | End: 2025-04-16

## 2025-04-16 RX ORDER — SODIUM CHLORIDE, SODIUM LACTATE, POTASSIUM CHLORIDE, CALCIUM CHLORIDE 600; 310; 30; 20 MG/100ML; MG/100ML; MG/100ML; MG/100ML
INJECTION, SOLUTION INTRAVENOUS CONTINUOUS
Status: DISCONTINUED | OUTPATIENT
Start: 2025-04-16 | End: 2025-04-16 | Stop reason: HOSPADM

## 2025-04-16 RX ORDER — PROPOFOL 10 MG/ML
INJECTION, EMULSION INTRAVENOUS PRN
Status: DISCONTINUED | OUTPATIENT
Start: 2025-04-16 | End: 2025-04-16

## 2025-04-16 RX ORDER — SODIUM CHLORIDE, SODIUM LACTATE, POTASSIUM CHLORIDE, CALCIUM CHLORIDE 600; 310; 30; 20 MG/100ML; MG/100ML; MG/100ML; MG/100ML
INJECTION, SOLUTION INTRAVENOUS CONTINUOUS PRN
Status: DISCONTINUED | OUTPATIENT
Start: 2025-04-16 | End: 2025-04-16

## 2025-04-16 RX ORDER — NORETHINDRONE ACETATE AND ETHINYL ESTRADIOL 1MG-20(21)
1 KIT ORAL DAILY
Qty: 90 TABLET | Refills: 3 | Status: SHIPPED | OUTPATIENT
Start: 2025-04-16

## 2025-04-16 RX ORDER — LIDOCAINE HYDROCHLORIDE 20 MG/ML
INJECTION, SOLUTION INFILTRATION; PERINEURAL PRN
Status: DISCONTINUED | OUTPATIENT
Start: 2025-04-16 | End: 2025-04-16

## 2025-04-16 RX ORDER — ACETAMINOPHEN 325 MG/1
975 TABLET ORAL ONCE
Status: COMPLETED | OUTPATIENT
Start: 2025-04-16 | End: 2025-04-16

## 2025-04-16 RX ORDER — MEPERIDINE HYDROCHLORIDE 25 MG/ML
25 INJECTION INTRAMUSCULAR; INTRAVENOUS; SUBCUTANEOUS
Status: DISCONTINUED | OUTPATIENT
Start: 2025-04-16 | End: 2025-04-16 | Stop reason: HOSPADM

## 2025-04-16 RX ADMIN — MIDAZOLAM 2 MG: 1 INJECTION INTRAMUSCULAR; INTRAVENOUS at 07:29

## 2025-04-16 RX ADMIN — FENTANYL CITRATE 50 MCG: 50 INJECTION INTRAMUSCULAR; INTRAVENOUS at 08:08

## 2025-04-16 RX ADMIN — FENTANYL CITRATE 50 MCG: 50 INJECTION, SOLUTION INTRAMUSCULAR; INTRAVENOUS at 09:12

## 2025-04-16 RX ADMIN — ONDANSETRON 4 MG: 2 INJECTION INTRAMUSCULAR; INTRAVENOUS at 08:26

## 2025-04-16 RX ADMIN — Medication 200 MG: at 08:44

## 2025-04-16 RX ADMIN — ACETAMINOPHEN 975 MG: 325 TABLET, FILM COATED ORAL at 10:22

## 2025-04-16 RX ADMIN — SODIUM CHLORIDE, SODIUM LACTATE, POTASSIUM CHLORIDE, AND CALCIUM CHLORIDE: .6; .31; .03; .02 INJECTION, SOLUTION INTRAVENOUS at 09:44

## 2025-04-16 RX ADMIN — IBUPROFEN 800 MG: 400 TABLET, FILM COATED ORAL at 10:24

## 2025-04-16 RX ADMIN — ROCURONIUM BROMIDE 50 MG: 50 INJECTION, SOLUTION INTRAVENOUS at 07:40

## 2025-04-16 RX ADMIN — DEXAMETHASONE SODIUM PHOSPHATE 4 MG: 4 INJECTION, SOLUTION INTRA-ARTICULAR; INTRALESIONAL; INTRAMUSCULAR; INTRAVENOUS; SOFT TISSUE at 07:56

## 2025-04-16 RX ADMIN — FENTANYL CITRATE 50 MCG: 50 INJECTION INTRAMUSCULAR; INTRAVENOUS at 07:40

## 2025-04-16 RX ADMIN — PROPOFOL 175 MCG/KG/MIN: 10 INJECTION, EMULSION INTRAVENOUS at 07:40

## 2025-04-16 RX ADMIN — SODIUM CHLORIDE, SODIUM LACTATE, POTASSIUM CHLORIDE, AND CALCIUM CHLORIDE: .6; .31; .03; .02 INJECTION, SOLUTION INTRAVENOUS at 07:29

## 2025-04-16 RX ADMIN — PHENAZOPYRIDINE 200 MG: 200 TABLET ORAL at 06:35

## 2025-04-16 RX ADMIN — LIDOCAINE HYDROCHLORIDE 60 MG: 20 INJECTION, SOLUTION INFILTRATION; PERINEURAL at 07:40

## 2025-04-16 RX ADMIN — PROPOFOL 200 MG: 10 INJECTION, EMULSION INTRAVENOUS at 07:40

## 2025-04-16 RX ADMIN — HEPARIN SODIUM 5000 UNITS: 5000 INJECTION, SOLUTION INTRAVENOUS; SUBCUTANEOUS at 06:35

## 2025-04-16 RX ADMIN — DEXMEDETOMIDINE HYDROCHLORIDE 12 MCG: 100 INJECTION, SOLUTION INTRAVENOUS at 07:35

## 2025-04-16 RX ADMIN — FENTANYL CITRATE 50 MCG: 50 INJECTION, SOLUTION INTRAMUSCULAR; INTRAVENOUS at 09:43

## 2025-04-16 ASSESSMENT — ACTIVITIES OF DAILY LIVING (ADL)
ADLS_ACUITY_SCORE: 41

## 2025-04-16 ASSESSMENT — LIFESTYLE VARIABLES: TOBACCO_USE: 0

## 2025-04-16 ASSESSMENT — ENCOUNTER SYMPTOMS: SEIZURES: 0

## 2025-04-16 NOTE — PROGRESS NOTES
Meets criteria for discharge.  Discharge instructions reviewed with pt and pt's designated responsible party.  Pt label on prescription bag from pharmacy matched to pt's wristband. Pharmacy bag opened with 3 prescriptions inside. Medications were reviewed to match pt wristband while pt and significant other agreed with identification. Prescriptions placed back in pharmacy bag resealed with tape and planned in purple patient belongings bag along with discharge instructions per pt request.      Gyn/Onc resident paged and reviewed procedure / discharge with patient prior to discharge.     Patient able to void prior to discharge.

## 2025-04-16 NOTE — INTERVAL H&P NOTE
"I have reviewed the surgical (or preoperative) H&P that is linked to this encounter, and examined the patient. There are no significant changes    Clinical Conditions Present on Arrival:  Clinically Significant Risk Factors Present on Admission                       # Overweight: Estimated body mass index is 27.46 kg/m  as calculated from the following:    Height as of this encounter: 1.651 m (5' 5\").    Weight as of this encounter: 74.8 kg (165 lb).     Venita Butron MD  Ob/Gyn PGY-3  04/16/25 6:43 AM        "

## 2025-04-16 NOTE — DISCHARGE INSTRUCTIONS
Same Day Surgery Discharge Instructions for  Sedation and General Anesthesia     It's not unusual to feel dizzy, light-headed or faint for up to 24 hours after surgery or while taking pain medication.  If you have these symptoms: sit for a few minutes before standing and have someone assist you when you get up to walk or use the bathroom.    You should rest and relax for the next 24 hours. We recommend you make arrangements to have an adult stay with you for at least 24 hours after your discharge.  Avoid hazardous and strenuous activity.    DO NOT DRIVE any vehicle or operate mechanical equipment for 24 hours following the end of your surgery.  Even though you may feel normal, your reactions may be affected by the medication you have received.    Do not drink alcoholic beverages for 24 hours following surgery.     Slowly progress to your regular diet as you feel able. It's not unusual to feel nauseated and/or vomit after receiving anesthesia.  If you develop these symptoms, drink clear liquids (apple juice, ginger ale, broth, 7-up, etc. ) until you feel better.  If your nausea and vomiting persists for 24 hours, please notify your surgeon.      All narcotic pain medications, along with inactivity and anesthesia, can cause constipation. Drinking plenty of liquids and increasing fiber intake will help.    For any questions of a medical nature, call your surgeon.    Do not make important decisions for 24 hours.    If you had general anesthesia, you may have a sore throat for a couple of days related to the breathing tube used during surgery.  You may use Cepacol lozenges to help with this discomfort.  If it worsens or if you develop a fever, contact your surgeon.     If you feel your pain is not well managed with the pain medications prescribed by your surgeon, please contact your surgeon's office to let them know so they can address your concerns.            Today you received Toradol, an antiinflammatory medication  similar to Ibuprofen.  You should not take other antiinflammatory medication, such as Ibuprofen, Motrin, Advil, Aleve, Naprosyn, etc until 4:40 pm.        Today you were given 975 mg of Tylenol at 10:30. The recommended daily maximum dose is 4000 mg.          **If you have questions or concerns about your procedure,   call Dr. Blue [phone]**

## 2025-04-16 NOTE — OP NOTE
Gynecologic Oncology Operative Report    4/16/2025  Joan Morales  [id number]    PREOPERATIVE DIAGNOSIS: Bilateral serous borderline ovarian tumors    POSTOPERATIVE DIAGNOSIS: Same, final pathology pending    PROCEDURES: Right salpingo-oophorectomy, left oophorectomy, omentectomy    SURGEON: Asha Stover MD     ASSISTANTS:  Daniel Templeton MD, PGY-4.     ANESTHESIA: General endotracheal     ESTIMATED BLOOD LOSS: 10 cc     IV FLUIDS: 1000 cc crystalloid    URINE OUTPUT: 500 cc clear urine     INDICATIONS: Joan Morales is a 34 year old who has had a long history of endometriosis and infertility.  She has undergone multiple procedures for the like.  Most recently she underwent a laparoscopic bilateral ovarian cystectomy in February, 2025, the pathology from which revealed serous borderline tumors of the bilateral ovaries with surface involvement on the right.  She has 5 frozen embryo's from her infertility treatments and desires to be able to proceed with embryo transfer.  Following a thorough discussion of the risks, benefits, indications and alternatives she consented to the above procedure.    FINDINGS: On EUA the patient had normal external genitalia and a palpable mass filling the pelvis which was somewhat fixed.  On laparoscopy the right ovary was enlarged and replaced by what appeared to be a hemorrhagic cyst consistent with endometrioma.  On the left, the fallopian tube was surgically absent and the left ovary had several small endometriotic appearing cysts.  The uterus was grossly normal with two small 2 cm leiomyoma, one in the anterior lower uterine segment and one at the posterior fundus.  There were several areas of powder burn lesions consistent with endometriosis on the peritoneal surfaces.  The omentum and upper abdomen were grossly normal in appearance and free of evidence of borderline tumor.  The remainder of the abdominal and pelvic surveys were unremarkable.    SPECIMENS:   Pelvic  washings  Right ovary and fallopian tube  Left ovary  Omentum    COMPLICATIONS: None.     CONDITION: Stable to PACU.     PROCEDURE:   Consent was reviewed with the patient in the preoperative setting and confirmed. She received heparin for venous thrombosis prevention. The patient was transferred to the operating room and placed in dorsal supine position. General anesthetic was obtained in the usual manner without noted difficulties. The patient was then positioned onto Hilario stirrups and an exam under anesthesia was performed with findings as described above.  The patient was prepped and draped for the above-mentioned procedure and Whitaker catheter was then placed under sterile techniques.  Timeout was called at which point the patient's name, procedure and operative site was confirmed by the operative team. Attention was then turned to the upper abdomen. Initially, an incision was made at the umbilicus and the Veress needle introduced through this stab incision. The abdomen was insufflated with an opening pressure of 2 mmHg. The Veress needle was removed. The initial midline 5 mm port was inserted without difficulties and initial survey revealed no damage to underlying structures.  The left lateral 5 mm and right lateral 12 mm ports were then placed under direct visualization without any injury noted to underlying structures. At this point, the patient was placed into steep Trendelenburg. The pelvis was inspected as well as the upper abdomen with findings as noted above. Pelvic washings were obtained and sent for cytology. Bowels were packed up into the upper abdomen with gentle traction.  The bilateral ureters were identified transperitoneally and the IP ligaments were isolated, cauterized and divided.  The bilateral utero-ovarian ligaments were then cauterized and divided.  There were some filmy adhesions from the bilateral adnexa to the pelvic sidewalls which were carefully taken down and the ovaries were placed  into separate Endo Catch bags, removed from the abdomen intact and sent for pathology.  We then brought the omentum down into the pelvis.  The Liga Sure was used to cauterize and divide the omentum infra colically.  The omentum was then placed into an Endo Catch bag, removed from the abdomen and sent for pathology. All laparoscopic instruments and ports were now removed and CO2 allowed to escape from the abdomen. Skin was reapproximated with 4-0 Vicryl sutures and Dermabond applied. The patient tolerated the procedure well and was taken to the recovery room in stable condition.    Sponge, lap and needle counts were reported as correct x2 and instrument count was correct x1.   Asha Stover MD  Gynecologic Oncology  [facility]

## 2025-04-16 NOTE — ANESTHESIA CARE TRANSFER NOTE
Patient: Joan Morales    Procedure: Procedure(s):  Pelvic exam under anesthesia,  laparoscopic right salpingo oophorectomy, left oophorectomy, omentectomy, abdominal washings       Diagnosis: Borderline serous cystadenoma of left ovary (H) [C56.2]  Borderline serous cystadenoma of right ovary (H) [C56.1]  Diagnosis Additional Information: No value filed.    Anesthesia Type:   General     Note:    Oropharynx: oropharynx clear of all foreign objects and spontaneously breathing  Level of Consciousness: awake  Oxygen Supplementation: face mask  Level of Supplemental Oxygen (L/min / FiO2): 6  Independent Airway: airway patency satisfactory and stable  Dentition: dentition unchanged  Vital Signs Stable: post-procedure vital signs reviewed and stable    Patient transferred to: PACU    Handoff Report: Identifed the Patient, Identified the Reponsible Provider, Reviewed the pertinent medical history, Discussed the surgical course, Reviewed Intra-OP anesthesia mangement and issues during anesthesia, Set expectations for post-procedure period and Allowed opportunity for questions and acknowledgement of understanding    Vitals:  Vitals Value Taken Time   /60 04/16/25 0859   Temp 36  C (96.8  F) 04/16/25 0904   Pulse 70 04/16/25 0904   Resp     SpO2 100 % 04/16/25 0904   Vitals shown include unfiled device data.    Electronically Signed By: VIRGINIA Cedeño CRNA  April 16, 2025  9:05 AM

## 2025-04-16 NOTE — ANESTHESIA PROCEDURE NOTES
Airway       Patient location during procedure: OR       Procedure Start/Stop Times: 4/16/2025 7:44 AM  Staff -        CRNA: Venita Snider APRN CRNA       Performed By: CRNA  Consent for Airway        Urgency: elective  Indications and Patient Condition       Indications for airway management: merline-procedural       Induction type:intravenous       Mask difficulty assessment: 1 - vent by mask    Final Airway Details       Final airway type: endotracheal airway       Successful airway: ETT - single  Endotracheal Airway Details        ETT size (mm): 7.0       Cuffed: yes       Successful intubation technique: video laryngoscopy       VL Blade Size: Glidescope 3       Grade View of Cords: 1       Adjucts: stylet       Position: Right       Measured from: gums/teeth       Secured at (cm): 21       Bite block used: None    Post intubation assessment        Placement verified by: capnometry, equal breath sounds and chest rise        Number of attempts at approach: 1       Number of other approaches attempted: 0       Secured with: tape       Ease of procedure: easy       Dentition: Intact and Unchanged    Medication(s) Administered   Medication Administration Time: 4/16/2025 7:44 AM

## 2025-04-16 NOTE — ANESTHESIA POSTPROCEDURE EVALUATION
Patient: Joan Morales    Procedure: Procedure(s):  Pelvic exam under anesthesia,  laparoscopic right salpingo oophorectomy, left oophorectomy, omentectomy, abdominal washings       Anesthesia Type:  General    Note:  Disposition: Outpatient   Postop Pain Control: Uneventful            Sign Out: Well controlled pain   PONV: No   Neuro/Psych: Uneventful            Sign Out: Acceptable/Baseline neuro status   Airway/Respiratory: Uneventful            Sign Out: Acceptable/Baseline resp. status   CV/Hemodynamics: Uneventful            Sign Out: Acceptable CV status; No obvious hypovolemia; No obvious fluid overload   Other NRE: NONE   DID A NON-ROUTINE EVENT OCCUR? No           Last vitals:  Vitals Value Taken Time   /73 04/16/25 1032   Temp 36.8  C (98.24  F) 04/16/25 1035   Pulse 98 04/16/25 1035   Resp 15 04/16/25 1035   SpO2 100 % 04/16/25 1034   Vitals shown include unfiled device data.    Electronically Signed By: Sam Madden MD  April 16, 2025  2:18 PM

## 2025-04-18 LAB
PATH REPORT.COMMENTS IMP SPEC: NORMAL
PATH REPORT.FINAL DX SPEC: NORMAL
PATH REPORT.GROSS SPEC: NORMAL
PATH REPORT.MICROSCOPIC SPEC OTHER STN: NORMAL

## 2025-04-21 ENCOUNTER — PATIENT OUTREACH (OUTPATIENT)
Dept: ONCOLOGY | Facility: CLINIC | Age: 35
End: 2025-04-21
Payer: COMMERCIAL

## 2025-04-21 NOTE — TELEPHONE ENCOUNTER
Follow up call to patient, s/p RSO, L oophorectomy, and omentectomy with Dr Stover on 4/16/25. Patient states overall she is doing well, up and moving around at home and working remotely from home. Abdominal discomfort improving each day, only required oxycodone 2 tabs the first day home. Now is taking alternating ibuprofen alternating with tylenol and today has not required medications so far. Eating and drinking well, normal bladder and bowel function. Patient states her abdominal port sites look good and glue intact. Patient will return to the clinic next week for post-op visit. No questions per patient, encouraged to call back with further concerns. Patient aware of plan.    Rosibel Bass, RN, BSN, OCN

## 2025-04-22 ENCOUNTER — MYC MEDICAL ADVICE (OUTPATIENT)
Dept: ONCOLOGY | Facility: CLINIC | Age: 35
End: 2025-04-22
Payer: COMMERCIAL

## 2025-04-22 LAB
PATH REPORT.COMMENTS IMP SPEC: NORMAL
PATH REPORT.FINAL DX SPEC: NORMAL
PATH REPORT.GROSS SPEC: NORMAL
PATH REPORT.MICROSCOPIC SPEC OTHER STN: NORMAL
PATH REPORT.RELEVANT HX SPEC: NORMAL
PATHOLOGY SYNOPTIC REPORT: NORMAL
PHOTO IMAGE: NORMAL

## 2025-04-22 NOTE — TELEPHONE ENCOUNTER
Asha Hicks MD  You6 minutes ago (11:54 AM)      I will try to call her this week as well but you can confirm that there is no cancer that was found.    My chart message sent to the patient with update.    Rosibel Bass RN, BSN, OCN

## 2025-04-24 PROCEDURE — 88323 CONSLTJ&REPRT MATRL PREP SLD: CPT | Mod: TC | Performed by: OBSTETRICS & GYNECOLOGY

## 2025-04-28 LAB
PATH REPORT.COMMENTS IMP SPEC: NORMAL
PATH REPORT.FINAL DX SPEC: NORMAL
PATH REPORT.GROSS SPEC: NORMAL
PATH REPORT.MICROSCOPIC SPEC OTHER STN: NORMAL
PATH REPORT.RELEVANT HX SPEC: NORMAL
PATH REPORT.RELEVANT HX SPEC: NORMAL
PATH REPORT.SITE OF ORIGIN SPEC: NORMAL

## 2025-04-29 ENCOUNTER — ONCOLOGY VISIT (OUTPATIENT)
Dept: ONCOLOGY | Facility: CLINIC | Age: 35
End: 2025-04-29
Attending: OBSTETRICS & GYNECOLOGY
Payer: COMMERCIAL

## 2025-04-29 VITALS
WEIGHT: 165.6 LBS | SYSTOLIC BLOOD PRESSURE: 131 MMHG | BODY MASS INDEX: 27.59 KG/M2 | DIASTOLIC BLOOD PRESSURE: 76 MMHG | RESPIRATION RATE: 16 BRPM | OXYGEN SATURATION: 97 % | HEART RATE: 64 BPM | TEMPERATURE: 97.6 F | HEIGHT: 65 IN

## 2025-04-29 DIAGNOSIS — C56.2 BORDERLINE SEROUS CYSTADENOMA OF LEFT OVARY (H): ICD-10-CM

## 2025-04-29 DIAGNOSIS — C56.1 BORDERLINE SEROUS CYSTADENOMA OF RIGHT OVARY (H): Primary | ICD-10-CM

## 2025-04-29 PROCEDURE — 99213 OFFICE O/P EST LOW 20 MIN: CPT | Performed by: OBSTETRICS & GYNECOLOGY

## 2025-04-29 ASSESSMENT — PAIN SCALES - GENERAL: PAINLEVEL_OUTOF10: NO PAIN (0)

## 2025-04-29 NOTE — NURSING NOTE
"Oncology Rooming Note    April 29, 2025 2:32 PM   Joan Morales is a 34 year old female who presents for:    Chief Complaint   Patient presents with    Oncology Clinic Visit     Borderline serous cystadenoma of right and left ovary     Initial Vitals: /76   Pulse 64   Temp 97.6  F (36.4  C) (Temporal)   Resp 16   Ht 1.651 m (5' 5\")   Wt 75.1 kg (165 lb 9.6 oz)   SpO2 97%   BMI 27.56 kg/m   Estimated body mass index is 27.56 kg/m  as calculated from the following:    Height as of this encounter: 1.651 m (5' 5\").    Weight as of this encounter: 75.1 kg (165 lb 9.6 oz). Body surface area is 1.86 meters squared.  No Pain (0) Comment: Data Unavailable   No LMP recorded.  Allergies reviewed: Yes  Medications reviewed: Yes    Medications: Medication refills not needed today.  Pharmacy name entered into [facility]:    [id number] [address] [facility] [place] [address]    Frailty Screening:   Is the patient here for a new oncology consult visit in cancer care? 2. No    PHQ9:  Did this patient require a PHQ9?: No      Clinical concerns: f/u       Daniela Del Angel CMA              "

## 2025-04-29 NOTE — LETTER
2025      Joan Morales  [address] [facility] [facility] [address]      Dear Colleague,    Thank you for referring your patient, Joan Morales, to the [facility]. Please see a copy of my visit note below.    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  She is accompanied today by her .  She is recovering well from surgery and has no specific post-operative concerns.    She has had a long course of infertility and endometriosis.  She has undergone several laparoscopies for fulguration of endometriosis, previously all pathology has been benign.  Most recently, however she underwent bilateral cystectomies which revealed borderline tumors of the bilateral ovaries with surfaces involvement on the right.  No pelvic washings were obtained.  She has 5 frozen day 3 embryo's with [facility] and is getting her infertility care in [place].  She has met with them and the infertility specialist is ok to proceed with embryo transfer if deemed appropriate.      24:  Laparoscopic bilateral ovarian cystectomy and bilateral tubal dye, left salpingectomy, cauterization of endometriosis with Dr Jennifer Schwab   Pathology:  Benign endometriotic cysts bilaterally    2/10/25:  Laparoscopic bilateral ovarian cystectomy with Dr Chapincito Riggs   Pathology:  Bilateral ovaries with serous borderline tumor, on the right there is focal surface involvement    25:  Laparoscopic right salpingo-oophorectomy, left oophorectomy, omentectomy    Pathology:  Serous borderline tumors of the bilateral ovaries residual, negative omentum      Obstetrics and Gynecology History:  G0  She has decided that she is not interested in further oocyte retrieval but would like to attempt to use the embryo's she currently has frozen      Past Medical  History:  Past Medical History:   Diagnosis Date     Ovarian tumor of borderline malignancy, unspecified laterality     Bilateral     Uncomplicated asthma        Past Surgical History:  Past Surgical History:   Procedure Laterality Date     EXAM UNDER ANESTHESIA PELVIC N/A 4/16/2025    Procedure: Pelvic exam under anesthesia,;  Surgeon: Asha Hicks MD;  Location:  OR     LAPAROSCOPIC ABLATION ENDOMETRIOSIS  01/23/2024    Procedure: Laparoscopic ablation endometriosis;  Surgeon: Schwab, Jennifer Lani, MD;  Location: [facility] OR     LAPAROSCOPIC CYSTECTOMY OVARIAN (BENIGN) Bilateral 01/23/2024    Procedure: laparoscopic bilateral ovarian cystectomy and bilateral tubal dye, LEFT SALPINGECTOMY, CAUTERIZATION OF ENDOMETRIOSIS;  Surgeon: Schwab, Jennifer Lani, MD;  Location:  OR     LAPAROSCOPIC SALPINGECTOMY Left 01/23/2024    Procedure: Laparoscopic salpingectomy;  Surgeon: Schwab, Jennifer Lani, MD;  Location:  OR     LAPAROSCOPIC SALPINGO-OOPHORECTOMY Bilateral 4/16/2025    Procedure: laparoscopic right salpingo oophorectomy, left oophorectomy, omentectomy, abdominal washings;  Surgeon: Asha Hicks MD;  Location:  OR     LAPAROSCOPIC TUBAL DYE STUDY Bilateral 01/23/2024    Procedure: and bilateral tubal dye study;  Surgeon: Schwab, Jennifer Lani, MD;  Location:  OR     ORTHOPEDIC SURGERY      broken arm     wisdom teeth extraction         Health Maintenance:  Last Pap Smear: 11/9/23              Result: Negative, HPV negative  She has not had a history of abnormal Pap smears.    Last Mammogram: N/A    Last Colonoscopy: N/A       Social History:  Lives with her spouse, feels safe at home.  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  [place].  Does not have an advanced directive on file and would like her , Bakari to be her POA.  Full code.    Family History:   The patient's family history is significant  "for.  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     A total of 15 minutes was spent on patient care today.       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  Given no evidence of metastatic disease, we discussed the limitations of surveillance and repeated US.  She will proceed with IVF and will reach out to our clinic once she has completed this to consider surveillance plan and completion hysterectomy at that time.     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     4.) Health maintenance issues addressed today include pt is up to date.          Thank you for allowing us to participate in the care of your patient.         Sincerely,    Asha Stover MD  Gynecologic Oncology  [facility]       CC  SELF, REFERRED      Again, thank you for allowing me to participate in the care of your patient.        Sincerely,        Justin Stover MD    Electronically signed"

## 2025-04-29 NOTE — PROGRESS NOTES
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  She is accompanied today by her .  She is recovering well from surgery and has no specific post-operative concerns.    She has had a long course of infertility and endometriosis.  She has undergone several laparoscopies for fulguration of endometriosis, previously all pathology has been benign.  Most recently, however she underwent bilateral cystectomies which revealed borderline tumors of the bilateral ovaries with surfaces involvement on the right.  No pelvic washings were obtained.  She has 5 frozen day 3 embryo's with [facility] and is getting her infertility care in [place].  She has met with them and the infertility specialist is ok to proceed with embryo transfer if deemed appropriate.      24:  Laparoscopic bilateral ovarian cystectomy and bilateral tubal dye, left salpingectomy, cauterization of endometriosis with Dr Jennifer Schwab   Pathology:  Benign endometriotic cysts bilaterally    2/10/25:  Laparoscopic bilateral ovarian cystectomy with Dr Chapincito Riggs   Pathology:  Bilateral ovaries with serous borderline tumor, on the right there is focal surface involvement    25:  Laparoscopic right salpingo-oophorectomy, left oophorectomy, omentectomy    Pathology:  Serous borderline tumors of the bilateral ovaries residual, negative omentum      Obstetrics and Gynecology History:  G0  She has decided that she is not interested in further oocyte retrieval but would like to attempt to use the embryo's she currently has frozen      Past Medical History:  Past Medical History:   Diagnosis Date    Ovarian tumor of borderline malignancy, unspecified laterality     Bilateral    Uncomplicated asthma        Past Surgical History:  Past Surgical History:   Procedure Laterality Date    EXAM UNDER ANESTHESIA  PELVIC N/A 4/16/2025    Procedure: Pelvic exam under anesthesia,;  Surgeon: Asha Hicks MD;  Location:  OR    LAPAROSCOPIC ABLATION ENDOMETRIOSIS  01/23/2024    Procedure: Laparoscopic ablation endometriosis;  Surgeon: Schwab, Jennifer Lani, MD;  Location:  OR    LAPAROSCOPIC CYSTECTOMY OVARIAN (BENIGN) Bilateral 01/23/2024    Procedure: laparoscopic bilateral ovarian cystectomy and bilateral tubal dye, LEFT SALPINGECTOMY, CAUTERIZATION OF ENDOMETRIOSIS;  Surgeon: Schwab, Jennifer Lani, MD;  Location: [facility]    LAPAROSCOPIC SALPINGECTOMY Left 01/23/2024    Procedure: Laparoscopic salpingectomy;  Surgeon: Schwab, Jennifer Lani, MD;  Location: [facility] OR    LAPAROSCOPIC SALPINGO-OOPHORECTOMY Bilateral 4/16/2025    Procedure: laparoscopic right salpingo oophorectomy, left oophorectomy, omentectomy, abdominal washings;  Surgeon: Asha Hicks MD;  Location:  OR    LAPAROSCOPIC TUBAL DYE STUDY Bilateral 01/23/2024    Procedure: and bilateral tubal dye study;  Surgeon: Schwab, Jennifer Lani, MD;  Location:  OR    ORTHOPEDIC SURGERY      broken arm    wisdom teeth extraction         Health Maintenance:  Last Pap Smear: 11/9/23              Result: Negative, HPV negative  She has not had a history of abnormal Pap smears.    Last Mammogram: N/A    Last Colonoscopy: N/A       Social History:  Lives with her spouse, feels safe at home.  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  [place].  Does not have an advanced directive on file and would like her , Bakari to be her POA.  Full code.    Family History:   The patient's family history is significant for.  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     A total of 15 minutes was spent on patient care today.       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  Given no evidence of metastatic disease, we discussed the limitations of surveillance and repeated US.  She will proceed with IVF and will reach out to our clinic once she has completed this to consider surveillance plan and completion hysterectomy at that time.     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     4.) Health maintenance issues addressed today include pt is up to date.          Thank you for allowing us to participate in the care of your patient.         Sincerely,    Asha Stover MD  Gynecologic Oncology  [facility]       CC  SELF, REFERRED    "

## 2025-05-09 ENCOUNTER — RESULTS FOLLOW-UP (OUTPATIENT)
Dept: LAB | Facility: CLINIC | Age: 35
End: 2025-05-09

## 2025-06-12 PROCEDURE — 88305 TISSUE EXAM BY PATHOLOGIST: CPT | Mod: TC | Performed by: OBSTETRICS & GYNECOLOGY

## 2025-06-12 PROCEDURE — 88305 TISSUE EXAM BY PATHOLOGIST: CPT | Mod: 26 | Performed by: PATHOLOGY

## 2025-06-13 ENCOUNTER — LAB REQUISITION (OUTPATIENT)
Dept: LAB | Facility: CLINIC | Age: 35
End: 2025-06-13
Payer: COMMERCIAL

## 2025-06-23 LAB
PATH REPORT.ADDENDUM SPEC: NORMAL
PATH REPORT.COMMENTS IMP SPEC: NORMAL
PATH REPORT.COMMENTS IMP SPEC: NORMAL
PATH REPORT.FINAL DX SPEC: NORMAL
PATH REPORT.GROSS SPEC: NORMAL
PATH REPORT.MICROSCOPIC SPEC OTHER STN: NORMAL
PATH REPORT.RELEVANT HX SPEC: NORMAL
PHOTO IMAGE: NORMAL

## (undated) DEVICE — ENDO TROCAR FIRST ENTRY KII FIOS Z-THRD 12X100MM CTF73

## (undated) DEVICE — CATH TRAY FOLEY SURESTEP 16FR W/URNE MTR STLK LATEX A303316A

## (undated) DEVICE — LIGHT HANDLE X1 31140133

## (undated) DEVICE — CATH TRAY FOLEY SURESTEP 16FR WDRAIN BAG STLK LATEX A300316A

## (undated) DEVICE — SU VICRYL+ 0 27 UR6 VLT VCP603H

## (undated) DEVICE — ENDO TROCAR SLEEVE KII Z-THREADED 05X100MM CTS02

## (undated) DEVICE — PAD PERI INDIV WRAP 11" 2022

## (undated) DEVICE — SOL NACL 0.9% INJ 1000ML BAG 2B1324X

## (undated) DEVICE — SOL WATER IRRIG 1000ML BOTTLE 2F7114

## (undated) DEVICE — DECANTER BAG 2002S

## (undated) DEVICE — SOL ADH LIQUID BENZOIN SWAB 0.6ML C1544

## (undated) DEVICE — ESU LIGASURE LAPAROSCOPIC BLUNT TIP SEALER 5MMX37CM LF1837

## (undated) DEVICE — KIT PATIENT POSITIONING PIGAZZI LATEX FREE 40580

## (undated) DEVICE — ENDO SCOPE WARMER LF TM500

## (undated) DEVICE — SOL NACL 0.9% IRRIG 1000ML BOTTLE 2F7124

## (undated) DEVICE — SUCTION MANIFOLD NEPTUNE 2 SYS 4 PORT 0702-020-000

## (undated) DEVICE — GLOVE BIOGEL PI ULTRATOUCH SZ 6.5 41165

## (undated) DEVICE — TRAP SPECIMEN MUCUS 80CC DYND44180

## (undated) DEVICE — DEVICE SUTURE PASSER 14GA WECK EFX EFXSP2

## (undated) DEVICE — GLOVE BIOGEL PI MICRO INDICATOR UNDERGLOVE SZ 7.0 48970

## (undated) DEVICE — ENDO POUCH UNIV RETRIEVAL SYSTEM INZII 10MM CD001

## (undated) DEVICE — TUBING C02 INSUFFLATION LAP FILTER HEATER 6198

## (undated) DEVICE — ENDO TROCAR FIRST ENTRY KII FIOS Z-THRD 05X100MM CTF03

## (undated) DEVICE — WIPES FOLEY CARE SURESTEP PROVON DFC100

## (undated) DEVICE — ESU HOLDER LAP INST DISP PURPLE LONG 330MM H-PRO-330

## (undated) DEVICE — ESU GROUND PAD UNIVERSAL W/O CORD

## (undated) DEVICE — POUCH TISSUE RETRIEVAL 15MM 6.00" INTRO TRS190SB2

## (undated) DEVICE — LINEN TOWEL PACK X5 5464

## (undated) DEVICE — SU DERMABOND ADVANCED .7ML DNX12

## (undated) DEVICE — DRAPE SLEEVE 599

## (undated) DEVICE — NDL INSUFFLATION 13GA 120MM C2201

## (undated) DEVICE — JELLY LUBRICATING SURGILUBE 2OZ TUBE

## (undated) DEVICE — ESU CORD MONOPOLAR 10'  E0510

## (undated) DEVICE — SYR 05ML SLIP TIP W/O NDL 309647

## (undated) DEVICE — Device

## (undated) DEVICE — SU VICRYL 4-0 PS-2 18" UND J496H

## (undated) DEVICE — SU MONOCRYL 4-0 PS-2 18" UND Y496G

## (undated) DEVICE — SUCTION IRR STRYKERFLOW II W/TIP 250-070-520

## (undated) DEVICE — PREP DURAPREP 26ML APL 8630

## (undated) DEVICE — TRAY PREP DRY SKIN SCRUB 067

## (undated) RX ORDER — PROPOFOL 10 MG/ML
INJECTION, EMULSION INTRAVENOUS
Status: DISPENSED
Start: 2025-04-16

## (undated) RX ORDER — FENTANYL CITRATE 0.05 MG/ML
INJECTION, SOLUTION INTRAMUSCULAR; INTRAVENOUS
Status: DISPENSED
Start: 2024-01-23

## (undated) RX ORDER — FENTANYL CITRATE 50 UG/ML
INJECTION, SOLUTION INTRAMUSCULAR; INTRAVENOUS
Status: DISPENSED
Start: 2025-04-16

## (undated) RX ORDER — HYDROMORPHONE HCL IN WATER/PF 6 MG/30 ML
PATIENT CONTROLLED ANALGESIA SYRINGE INTRAVENOUS
Status: DISPENSED
Start: 2024-01-23

## (undated) RX ORDER — OXYCODONE HYDROCHLORIDE 5 MG/1
TABLET ORAL
Status: DISPENSED
Start: 2024-01-23

## (undated) RX ORDER — ACETAMINOPHEN 325 MG/1
TABLET ORAL
Status: DISPENSED
Start: 2024-01-23

## (undated) RX ORDER — PHENAZOPYRIDINE HYDROCHLORIDE 200 MG/1
TABLET, FILM COATED ORAL
Status: DISPENSED
Start: 2025-04-16

## (undated) RX ORDER — FENTANYL CITRATE 50 UG/ML
INJECTION, SOLUTION INTRAMUSCULAR; INTRAVENOUS
Status: DISPENSED
Start: 2024-01-23

## (undated) RX ORDER — HEPARIN SODIUM 5000 [USP'U]/.5ML
INJECTION, SOLUTION INTRAVENOUS; SUBCUTANEOUS
Status: DISPENSED
Start: 2025-04-16

## (undated) RX ORDER — PROPOFOL 10 MG/ML
INJECTION, EMULSION INTRAVENOUS
Status: DISPENSED
Start: 2024-01-23

## (undated) RX ORDER — FENTANYL CITRATE 0.05 MG/ML
INJECTION, SOLUTION INTRAMUSCULAR; INTRAVENOUS
Status: DISPENSED
Start: 2025-04-16

## (undated) RX ORDER — SCOLOPAMINE TRANSDERMAL SYSTEM 1 MG/1
PATCH, EXTENDED RELEASE TRANSDERMAL
Status: DISPENSED
Start: 2024-01-23

## (undated) RX ORDER — HYDROMORPHONE HYDROCHLORIDE 1 MG/ML
INJECTION, SOLUTION INTRAMUSCULAR; INTRAVENOUS; SUBCUTANEOUS
Status: DISPENSED
Start: 2024-01-23

## (undated) RX ORDER — KETOROLAC TROMETHAMINE 30 MG/ML
INJECTION, SOLUTION INTRAMUSCULAR; INTRAVENOUS
Status: DISPENSED
Start: 2024-01-23

## (undated) RX ORDER — IBUPROFEN 400 MG/1
TABLET, FILM COATED ORAL
Status: DISPENSED
Start: 2025-04-16

## (undated) RX ORDER — DEXTROSE MONOHYDRATE 50 MG/ML
INJECTION, SOLUTION INTRAVENOUS
Status: DISPENSED
Start: 2024-01-23

## (undated) RX ORDER — ACETAMINOPHEN 325 MG/1
TABLET ORAL
Status: DISPENSED
Start: 2025-04-16

## (undated) RX ORDER — DOXYCYCLINE 100 MG/10ML
INJECTION, POWDER, LYOPHILIZED, FOR SOLUTION INTRAVENOUS
Status: DISPENSED
Start: 2024-01-23

## (undated) RX ORDER — MEPERIDINE HYDROCHLORIDE 25 MG/ML
INJECTION INTRAMUSCULAR; INTRAVENOUS; SUBCUTANEOUS
Status: DISPENSED
Start: 2025-04-16